# Patient Record
Sex: FEMALE | Race: WHITE | URBAN - METROPOLITAN AREA
[De-identification: names, ages, dates, MRNs, and addresses within clinical notes are randomized per-mention and may not be internally consistent; named-entity substitution may affect disease eponyms.]

---

## 2021-12-03 ENCOUNTER — OFFICE VISIT (OUTPATIENT)
Dept: FAMILY MEDICINE CLINIC | Facility: CLINIC | Age: 81
End: 2021-12-03
Payer: COMMERCIAL

## 2021-12-03 VITALS
TEMPERATURE: 99 F | HEART RATE: 90 BPM | BODY MASS INDEX: 27.72 KG/M2 | OXYGEN SATURATION: 96 % | RESPIRATION RATE: 20 BRPM | HEIGHT: 61 IN | SYSTOLIC BLOOD PRESSURE: 128 MMHG | DIASTOLIC BLOOD PRESSURE: 72 MMHG | WEIGHT: 146.81 LBS

## 2021-12-03 DIAGNOSIS — T81.89XA NON-HEALING SURGICAL WOUND, INITIAL ENCOUNTER: Primary | ICD-10-CM

## 2021-12-03 PROCEDURE — 3074F SYST BP LT 130 MM HG: CPT | Performed by: PHYSICIAN ASSISTANT

## 2021-12-03 PROCEDURE — 3078F DIAST BP <80 MM HG: CPT | Performed by: PHYSICIAN ASSISTANT

## 2021-12-03 PROCEDURE — 99213 OFFICE O/P EST LOW 20 MIN: CPT | Performed by: PHYSICIAN ASSISTANT

## 2021-12-03 PROCEDURE — 3008F BODY MASS INDEX DOCD: CPT | Performed by: PHYSICIAN ASSISTANT

## 2021-12-03 RX ORDER — AMLODIPINE BESYLATE 5 MG/1
TABLET ORAL
COMMUNITY
Start: 2021-10-28

## 2021-12-03 RX ORDER — RIVAROXABAN 2.5 MG/1
2.5 TABLET, FILM COATED ORAL 2 TIMES DAILY
COMMUNITY
Start: 2021-08-19

## 2021-12-03 RX ORDER — OMEPRAZOLE 40 MG/1
CAPSULE, DELAYED RELEASE ORAL
COMMUNITY
Start: 2021-10-12

## 2021-12-03 RX ORDER — FUROSEMIDE 40 MG/1
TABLET ORAL
COMMUNITY
Start: 2021-10-12

## 2021-12-03 RX ORDER — GLIMEPIRIDE 4 MG/1
TABLET ORAL
COMMUNITY
Start: 2021-10-12

## 2021-12-03 RX ORDER — FLUOXETINE 10 MG/1
CAPSULE ORAL
COMMUNITY
Start: 2021-11-23

## 2021-12-03 NOTE — PROGRESS NOTES
CHIEF COMPLAINT:   Patient presents with: Other: left toes amputation on 10/2021      HPI:   Vasquez Spaulding is a 78year old female who presents for evaluation of a skin stump of left foot . Here with son.  Patient states wound was changed by wound care to murmur  EXTREMITIES: patients left foot wrapped, asked to take down dressing but patient refused, states does not want unwrapped and would like just picture of foot from earlier today looked at   LYMPH: no cervical, submandibular, or supraclavicular lympha

## 2021-12-26 PROBLEM — E78.5 HYPERLIPIDEMIA, UNSPECIFIED HYPERLIPIDEMIA TYPE: Status: ACTIVE | Noted: 2021-12-26

## 2021-12-26 PROBLEM — F33.0 MDD (MAJOR DEPRESSIVE DISORDER), RECURRENT EPISODE, MILD (HCC): Status: ACTIVE | Noted: 2021-12-26

## 2021-12-26 PROBLEM — M54.9 ACUTE MIDLINE BACK PAIN, UNSPECIFIED BACK LOCATION: Status: ACTIVE | Noted: 2021-12-26

## 2021-12-26 PROBLEM — K21.9 GASTROESOPHAGEAL REFLUX DISEASE WITHOUT ESOPHAGITIS: Status: ACTIVE | Noted: 2021-12-26

## 2021-12-26 PROBLEM — E11.8 CONTROLLED TYPE 2 DIABETES MELLITUS WITH COMPLICATION, WITH LONG-TERM CURRENT USE OF INSULIN (HCC): Status: ACTIVE | Noted: 2021-12-26

## 2021-12-26 PROBLEM — I73.9 PVD (PERIPHERAL VASCULAR DISEASE) (HCC): Status: ACTIVE | Noted: 2021-12-26

## 2021-12-26 PROBLEM — N18.30 STAGE 3 CHRONIC KIDNEY DISEASE, UNSPECIFIED WHETHER STAGE 3A OR 3B CKD (HCC): Status: ACTIVE | Noted: 2021-12-26

## 2021-12-26 PROBLEM — R53.1 WEAKNESS: Status: ACTIVE | Noted: 2021-12-26

## 2021-12-26 PROBLEM — D64.9 ANEMIA, UNSPECIFIED TYPE: Status: ACTIVE | Noted: 2021-12-26

## 2021-12-26 PROBLEM — Z79.4 CONTROLLED TYPE 2 DIABETES MELLITUS WITH COMPLICATION, WITH LONG-TERM CURRENT USE OF INSULIN (HCC): Status: ACTIVE | Noted: 2021-12-26

## 2021-12-26 PROBLEM — I10 ESSENTIAL HYPERTENSION: Status: ACTIVE | Noted: 2021-12-26

## 2021-12-26 PROBLEM — M86.9 OSTEOMYELITIS OF LEFT FOOT, UNSPECIFIED TYPE (HCC): Status: ACTIVE | Noted: 2021-12-26

## 2021-12-26 PROBLEM — G62.9 NEUROPATHY: Status: ACTIVE | Noted: 2021-12-26

## 2021-12-31 ENCOUNTER — LAB REQUISITION (OUTPATIENT)
Dept: LAB | Facility: HOSPITAL | Age: 81
End: 2021-12-31
Payer: MEDICARE

## 2021-12-31 DIAGNOSIS — N39.0 URINARY TRACT INFECTION, SITE NOT SPECIFIED: ICD-10-CM

## 2021-12-31 LAB
BILIRUB UR QL STRIP.AUTO: NEGATIVE
COLOR UR AUTO: YELLOW
GLUCOSE UR STRIP.AUTO-MCNC: 50 MG/DL
KETONES UR STRIP.AUTO-MCNC: NEGATIVE MG/DL
NITRITE UR QL STRIP.AUTO: NEGATIVE
PH UR STRIP.AUTO: 5 [PH] (ref 5–8)
PROT UR STRIP.AUTO-MCNC: 100 MG/DL
RBC #/AREA URNS AUTO: >10 /HPF
RBC UR QL AUTO: NEGATIVE
SP GR UR STRIP.AUTO: 1.02 (ref 1–1.03)
UROBILINOGEN UR STRIP.AUTO-MCNC: <2 MG/DL
WBC #/AREA URNS AUTO: >50 /HPF
WBC CLUMPS UR QL AUTO: PRESENT /HPF

## 2021-12-31 PROCEDURE — 87077 CULTURE AEROBIC IDENTIFY: CPT | Performed by: FAMILY MEDICINE

## 2021-12-31 PROCEDURE — 87186 SC STD MICRODIL/AGAR DIL: CPT | Performed by: FAMILY MEDICINE

## 2021-12-31 PROCEDURE — 87086 URINE CULTURE/COLONY COUNT: CPT | Performed by: FAMILY MEDICINE

## 2021-12-31 PROCEDURE — 81001 URINALYSIS AUTO W/SCOPE: CPT | Performed by: FAMILY MEDICINE

## 2022-01-06 ENCOUNTER — NURSE ONLY (OUTPATIENT)
Dept: LAB | Age: 82
End: 2022-01-06
Attending: FAMILY MEDICINE
Payer: MEDICARE

## 2022-01-06 DIAGNOSIS — E11.9 DIABETES (HCC): Primary | ICD-10-CM

## 2022-01-06 LAB
ALBUMIN SERPL-MCNC: 3 G/DL (ref 3.4–5)
ALBUMIN/GLOB SERPL: 1.2 {RATIO} (ref 1–2)
ALP LIVER SERPL-CCNC: 101 U/L
ALT SERPL-CCNC: 17 U/L
ANION GAP SERPL CALC-SCNC: 6 MMOL/L (ref 0–18)
AST SERPL-CCNC: 12 U/L (ref 15–37)
BILIRUB SERPL-MCNC: 0.2 MG/DL (ref 0.1–2)
BUN BLD-MCNC: 26 MG/DL (ref 7–18)
CALCIUM BLD-MCNC: 10 MG/DL (ref 8.5–10.1)
CHLORIDE SERPL-SCNC: 102 MMOL/L (ref 98–112)
CO2 SERPL-SCNC: 30 MMOL/L (ref 21–32)
CREAT BLD-MCNC: 1.44 MG/DL
FASTING STATUS PATIENT QL REPORTED: YES
GLOBULIN PLAS-MCNC: 2.5 G/DL (ref 2.8–4.4)
GLUCOSE BLD-MCNC: 177 MG/DL (ref 70–99)
OSMOLALITY SERPL CALC.SUM OF ELEC: 295 MOSM/KG (ref 275–295)
POTASSIUM SERPL-SCNC: 4.1 MMOL/L (ref 3.5–5.1)
PROT SERPL-MCNC: 5.5 G/DL (ref 6.4–8.2)
SODIUM SERPL-SCNC: 138 MMOL/L (ref 136–145)

## 2022-01-06 PROCEDURE — 80053 COMPREHEN METABOLIC PANEL: CPT

## 2022-08-24 ENCOUNTER — HOSPITAL ENCOUNTER (INPATIENT)
Facility: HOSPITAL | Age: 82
LOS: 9 days | Discharge: NURSING, REHAB OR LONG-TERM CARE | DRG: 617 | End: 2022-09-02
Attending: EMERGENCY MEDICINE | Admitting: INTERNAL MEDICINE
Payer: MEDICARE

## 2022-08-24 ENCOUNTER — APPOINTMENT (OUTPATIENT)
Dept: GENERAL RADIOLOGY | Facility: HOSPITAL | Age: 82
DRG: 617 | End: 2022-08-24
Attending: EMERGENCY MEDICINE
Payer: MEDICARE

## 2022-08-24 DIAGNOSIS — E13.52: ICD-10-CM

## 2022-08-24 DIAGNOSIS — E11.8 DIABETIC FOOT (HCC): Primary | ICD-10-CM

## 2022-08-24 DIAGNOSIS — R73.9 HYPERGLYCEMIA: ICD-10-CM

## 2022-08-24 LAB
ALBUMIN SERPL-MCNC: 2.7 G/DL (ref 3.4–5)
ALBUMIN/GLOB SERPL: 0.6 {RATIO} (ref 1–2)
ALP LIVER SERPL-CCNC: 167 U/L
ALT SERPL-CCNC: 13 U/L
ANION GAP SERPL CALC-SCNC: 10 MMOL/L (ref 0–18)
AST SERPL-CCNC: 9 U/L (ref 15–37)
ATRIAL RATE: 112 BPM
BASOPHILS # BLD AUTO: 0.14 X10(3) UL (ref 0–0.2)
BASOPHILS NFR BLD AUTO: 0.6 %
BILIRUB SERPL-MCNC: 0.5 MG/DL (ref 0.1–2)
BILIRUB UR QL STRIP.AUTO: NEGATIVE
BUN BLD-MCNC: 25 MG/DL (ref 7–18)
CALCIUM BLD-MCNC: 9.9 MG/DL (ref 8.5–10.1)
CHLORIDE SERPL-SCNC: 86 MMOL/L (ref 98–112)
CLARITY UR REFRACT.AUTO: CLEAR
CO2 SERPL-SCNC: 30 MMOL/L (ref 21–32)
COLOR UR AUTO: YELLOW
CREAT BLD-MCNC: 1.79 MG/DL
CREAT UR-SCNC: 18.9 MG/DL
EOSINOPHIL # BLD AUTO: 0.07 X10(3) UL (ref 0–0.7)
EOSINOPHIL NFR BLD AUTO: 0.3 %
ERYTHROCYTE [DISTWIDTH] IN BLOOD BY AUTOMATED COUNT: 13.8 %
EST. AVERAGE GLUCOSE BLD GHB EST-MCNC: 367 MG/DL (ref 68–126)
GFR SERPLBLD BASED ON 1.73 SQ M-ARVRAT: 28 ML/MIN/1.73M2 (ref 60–?)
GLOBULIN PLAS-MCNC: 4.2 G/DL (ref 2.8–4.4)
GLUCOSE BLD-MCNC: 167 MG/DL (ref 70–99)
GLUCOSE BLD-MCNC: 422 MG/DL (ref 70–99)
GLUCOSE UR STRIP.AUTO-MCNC: 500 MG/DL
HBA1C MFR BLD: 14.4 % (ref ?–5.7)
HCT VFR BLD AUTO: 40.1 %
HGB BLD-MCNC: 14 G/DL
IMM GRANULOCYTES # BLD AUTO: 0.44 X10(3) UL (ref 0–1)
IMM GRANULOCYTES NFR BLD: 2 %
KETONES UR STRIP.AUTO-MCNC: NEGATIVE MG/DL
LACTATE SERPL-SCNC: 1.5 MMOL/L (ref 0.4–2)
LEUKOCYTE ESTERASE UR QL STRIP.AUTO: NEGATIVE
LYMPHOCYTES # BLD AUTO: 1.78 X10(3) UL (ref 1–4)
LYMPHOCYTES NFR BLD AUTO: 8 %
MCH RBC QN AUTO: 29.2 PG (ref 26–34)
MCHC RBC AUTO-ENTMCNC: 34.9 G/DL (ref 31–37)
MCV RBC AUTO: 83.5 FL
MONOCYTES # BLD AUTO: 1.05 X10(3) UL (ref 0.1–1)
MONOCYTES NFR BLD AUTO: 4.7 %
NEUTROPHILS # BLD AUTO: 18.81 X10 (3) UL (ref 1.5–7.7)
NEUTROPHILS # BLD AUTO: 18.81 X10(3) UL (ref 1.5–7.7)
NEUTROPHILS NFR BLD AUTO: 84.4 %
NITRITE UR QL STRIP.AUTO: NEGATIVE
OSMOLALITY SERPL CALC.SUM OF ELEC: 284 MOSM/KG (ref 275–295)
P AXIS: 55 DEGREES
P-R INTERVAL: 156 MS
PH UR STRIP.AUTO: 7 [PH] (ref 5–8)
PLATELET # BLD AUTO: 315 10(3)UL (ref 150–450)
POTASSIUM SERPL-SCNC: 3.5 MMOL/L (ref 3.5–5.1)
PROT SERPL-MCNC: 6.9 G/DL (ref 6.4–8.2)
Q-T INTERVAL: 336 MS
QRS DURATION: 82 MS
QTC CALCULATION (BEZET): 458 MS
R AXIS: -40 DEGREES
RBC # BLD AUTO: 4.8 X10(6)UL
RBC UR QL AUTO: NEGATIVE
SARS-COV-2 RNA RESP QL NAA+PROBE: NOT DETECTED
SODIUM SERPL-SCNC: 126 MMOL/L (ref 136–145)
SODIUM SERPL-SCNC: 32 MMOL/L
SP GR UR STRIP.AUTO: 1.01 (ref 1–1.03)
T AXIS: 38 DEGREES
TROPONIN I HIGH SENSITIVITY: 17 NG/L
UROBILINOGEN UR STRIP.AUTO-MCNC: 0.2 MG/DL
UUN UR-MCNC: 208 MG/DL
VENTRICULAR RATE: 112 BPM
WBC # BLD AUTO: 22.3 X10(3) UL (ref 4–11)

## 2022-08-24 PROCEDURE — 99285 EMERGENCY DEPT VISIT HI MDM: CPT

## 2022-08-24 PROCEDURE — 96365 THER/PROPH/DIAG IV INF INIT: CPT

## 2022-08-24 PROCEDURE — 85025 COMPLETE CBC W/AUTO DIFF WBC: CPT | Performed by: EMERGENCY MEDICINE

## 2022-08-24 PROCEDURE — 82570 ASSAY OF URINE CREATININE: CPT | Performed by: INTERNAL MEDICINE

## 2022-08-24 PROCEDURE — 87040 BLOOD CULTURE FOR BACTERIA: CPT | Performed by: EMERGENCY MEDICINE

## 2022-08-24 PROCEDURE — 84300 ASSAY OF URINE SODIUM: CPT | Performed by: INTERNAL MEDICINE

## 2022-08-24 PROCEDURE — 84540 ASSAY OF URINE/UREA-N: CPT | Performed by: INTERNAL MEDICINE

## 2022-08-24 PROCEDURE — 81015 MICROSCOPIC EXAM OF URINE: CPT | Performed by: EMERGENCY MEDICINE

## 2022-08-24 PROCEDURE — 86140 C-REACTIVE PROTEIN: CPT | Performed by: PODIATRIST

## 2022-08-24 PROCEDURE — 81001 URINALYSIS AUTO W/SCOPE: CPT | Performed by: EMERGENCY MEDICINE

## 2022-08-24 PROCEDURE — 83036 HEMOGLOBIN GLYCOSYLATED A1C: CPT | Performed by: INTERNAL MEDICINE

## 2022-08-24 PROCEDURE — 80053 COMPREHEN METABOLIC PANEL: CPT | Performed by: EMERGENCY MEDICINE

## 2022-08-24 PROCEDURE — 82962 GLUCOSE BLOOD TEST: CPT

## 2022-08-24 PROCEDURE — 71045 X-RAY EXAM CHEST 1 VIEW: CPT | Performed by: EMERGENCY MEDICINE

## 2022-08-24 PROCEDURE — 83605 ASSAY OF LACTIC ACID: CPT | Performed by: EMERGENCY MEDICINE

## 2022-08-24 PROCEDURE — 36415 COLL VENOUS BLD VENIPUNCTURE: CPT

## 2022-08-24 PROCEDURE — 96361 HYDRATE IV INFUSION ADD-ON: CPT

## 2022-08-24 PROCEDURE — 93005 ELECTROCARDIOGRAM TRACING: CPT

## 2022-08-24 PROCEDURE — 84484 ASSAY OF TROPONIN QUANT: CPT | Performed by: EMERGENCY MEDICINE

## 2022-08-24 PROCEDURE — 93010 ELECTROCARDIOGRAM REPORT: CPT

## 2022-08-24 RX ORDER — INSULIN ASPART 100 [IU]/ML
0.2 INJECTION, SOLUTION INTRAVENOUS; SUBCUTANEOUS ONCE
Status: COMPLETED | OUTPATIENT
Start: 2022-08-24 | End: 2022-08-24

## 2022-08-24 RX ORDER — ACETAMINOPHEN 500 MG
500 TABLET ORAL EVERY 4 HOURS PRN
Status: DISCONTINUED | OUTPATIENT
Start: 2022-08-24 | End: 2022-09-02

## 2022-08-24 RX ORDER — MULTIVIT-MIN/IRON FUM/FOLIC AC 7.5 MG-4
1 TABLET ORAL DAILY
COMMUNITY

## 2022-08-24 RX ORDER — DEXTROSE MONOHYDRATE 25 G/50ML
50 INJECTION, SOLUTION INTRAVENOUS
Status: DISCONTINUED | OUTPATIENT
Start: 2022-08-24 | End: 2022-09-02

## 2022-08-24 RX ORDER — LOVASTATIN 20 MG/1
20 TABLET ORAL NIGHTLY
COMMUNITY

## 2022-08-24 RX ORDER — SODIUM CHLORIDE 9 MG/ML
INJECTION, SOLUTION INTRAVENOUS CONTINUOUS
Status: ACTIVE | OUTPATIENT
Start: 2022-08-24 | End: 2022-08-25

## 2022-08-24 RX ORDER — MELATONIN
1000 DAILY
COMMUNITY

## 2022-08-24 RX ORDER — DESVENLAFAXINE 25 MG/1
50 TABLET, EXTENDED RELEASE ORAL DAILY
COMMUNITY
End: 2022-09-02

## 2022-08-24 RX ORDER — METOCLOPRAMIDE HYDROCHLORIDE 5 MG/ML
5 INJECTION INTRAMUSCULAR; INTRAVENOUS EVERY 8 HOURS PRN
Status: DISCONTINUED | OUTPATIENT
Start: 2022-08-24 | End: 2022-09-02

## 2022-08-24 RX ORDER — HEPARIN SODIUM 5000 [USP'U]/ML
5000 INJECTION, SOLUTION INTRAVENOUS; SUBCUTANEOUS EVERY 8 HOURS SCHEDULED
Status: DISCONTINUED | OUTPATIENT
Start: 2022-08-24 | End: 2022-09-02

## 2022-08-24 RX ORDER — ASPIRIN 81 MG/1
81 TABLET ORAL DAILY
COMMUNITY

## 2022-08-24 RX ORDER — TRAMADOL HYDROCHLORIDE 50 MG/1
50 TABLET ORAL EVERY 8 HOURS PRN
Status: ON HOLD | COMMUNITY
End: 2022-09-02

## 2022-08-24 RX ORDER — NICOTINE POLACRILEX 4 MG
15 LOZENGE BUCCAL
Status: DISCONTINUED | OUTPATIENT
Start: 2022-08-24 | End: 2022-09-02

## 2022-08-24 RX ORDER — ZINC SULFATE 50(220)MG
220 CAPSULE ORAL DAILY
COMMUNITY

## 2022-08-24 RX ORDER — MULTIVITAMIN/IRON/FOLIC ACID 18MG-0.4MG
500 TABLET ORAL DAILY
COMMUNITY

## 2022-08-24 RX ORDER — SODIUM CHLORIDE 9 MG/ML
INJECTION, SOLUTION INTRAVENOUS CONTINUOUS
Status: DISCONTINUED | OUTPATIENT
Start: 2022-08-24 | End: 2022-08-25

## 2022-08-24 RX ORDER — NICOTINE POLACRILEX 4 MG
30 LOZENGE BUCCAL
Status: DISCONTINUED | OUTPATIENT
Start: 2022-08-24 | End: 2022-09-02

## 2022-08-24 RX ORDER — ONDANSETRON 2 MG/ML
4 INJECTION INTRAMUSCULAR; INTRAVENOUS EVERY 6 HOURS PRN
Status: DISCONTINUED | OUTPATIENT
Start: 2022-08-24 | End: 2022-09-02

## 2022-08-24 RX ORDER — MELATONIN
325
COMMUNITY

## 2022-08-24 RX ORDER — MULTIVIT WITH MINERALS/LUTEIN
1000 TABLET ORAL DAILY
COMMUNITY

## 2022-08-25 ENCOUNTER — APPOINTMENT (OUTPATIENT)
Dept: GENERAL RADIOLOGY | Facility: HOSPITAL | Age: 82
DRG: 617 | End: 2022-08-25
Attending: PODIATRIST
Payer: MEDICARE

## 2022-08-25 LAB
ALBUMIN SERPL-MCNC: 2.2 G/DL (ref 3.4–5)
ANION GAP SERPL CALC-SCNC: 6 MMOL/L (ref 0–18)
BASOPHILS # BLD AUTO: 0.12 X10(3) UL (ref 0–0.2)
BASOPHILS NFR BLD AUTO: 0.6 %
BUN BLD-MCNC: 21 MG/DL (ref 7–18)
CALCIUM BLD-MCNC: 9.6 MG/DL (ref 8.5–10.1)
CHLORIDE SERPL-SCNC: 96 MMOL/L (ref 98–112)
CO2 SERPL-SCNC: 31 MMOL/L (ref 21–32)
CREAT BLD-MCNC: 1.51 MG/DL
CRP SERPL-MCNC: 12.9 MG/DL (ref ?–0.3)
CRP SERPL-MCNC: 17.9 MG/DL (ref ?–0.3)
EOSINOPHIL # BLD AUTO: 0.3 X10(3) UL (ref 0–0.7)
EOSINOPHIL NFR BLD AUTO: 1.5 %
ERYTHROCYTE [DISTWIDTH] IN BLOOD BY AUTOMATED COUNT: 13.8 %
ERYTHROCYTE [SEDIMENTATION RATE] IN BLOOD: 39 MM/HR
ERYTHROCYTE [SEDIMENTATION RATE] IN BLOOD: 39 MM/HR
GFR SERPLBLD BASED ON 1.73 SQ M-ARVRAT: 34 ML/MIN/1.73M2 (ref 60–?)
GLUCOSE BLD-MCNC: 132 MG/DL (ref 70–99)
GLUCOSE BLD-MCNC: 138 MG/DL (ref 70–99)
GLUCOSE BLD-MCNC: 225 MG/DL (ref 70–99)
GLUCOSE BLD-MCNC: 256 MG/DL (ref 70–99)
GLUCOSE BLD-MCNC: 275 MG/DL (ref 70–99)
HCT VFR BLD AUTO: 38.9 %
HGB BLD-MCNC: 13.2 G/DL
IMM GRANULOCYTES # BLD AUTO: 0.46 X10(3) UL (ref 0–1)
IMM GRANULOCYTES NFR BLD: 2.3 %
LYMPHOCYTES # BLD AUTO: 1.77 X10(3) UL (ref 1–4)
LYMPHOCYTES NFR BLD AUTO: 8.8 %
MCH RBC QN AUTO: 29 PG (ref 26–34)
MCHC RBC AUTO-ENTMCNC: 33.9 G/DL (ref 31–37)
MCV RBC AUTO: 85.5 FL
MONOCYTES # BLD AUTO: 1.26 X10(3) UL (ref 0.1–1)
MONOCYTES NFR BLD AUTO: 6.3 %
NEUTROPHILS # BLD AUTO: 16.14 X10 (3) UL (ref 1.5–7.7)
NEUTROPHILS # BLD AUTO: 16.14 X10(3) UL (ref 1.5–7.7)
NEUTROPHILS NFR BLD AUTO: 80.5 %
NT-PROBNP SERPL-MCNC: 1814 PG/ML (ref ?–450)
OSMOLALITY SERPL CALC.SUM OF ELEC: 281 MOSM/KG (ref 275–295)
PHOSPHATE SERPL-MCNC: 2.4 MG/DL (ref 2.5–4.9)
PLATELET # BLD AUTO: 308 10(3)UL (ref 150–450)
POTASSIUM SERPL-SCNC: 2.9 MMOL/L (ref 3.5–5.1)
RBC # BLD AUTO: 4.55 X10(6)UL
SODIUM SERPL-SCNC: 133 MMOL/L (ref 136–145)
WBC # BLD AUTO: 20.1 X10(3) UL (ref 4–11)

## 2022-08-25 PROCEDURE — 87205 SMEAR GRAM STAIN: CPT | Performed by: INTERNAL MEDICINE

## 2022-08-25 PROCEDURE — 82962 GLUCOSE BLOOD TEST: CPT

## 2022-08-25 PROCEDURE — 73620 X-RAY EXAM OF FOOT: CPT | Performed by: PODIATRIST

## 2022-08-25 PROCEDURE — 87186 SC STD MICRODIL/AGAR DIL: CPT | Performed by: INTERNAL MEDICINE

## 2022-08-25 PROCEDURE — 87075 CULTR BACTERIA EXCEPT BLOOD: CPT | Performed by: INTERNAL MEDICINE

## 2022-08-25 PROCEDURE — 87077 CULTURE AEROBIC IDENTIFY: CPT | Performed by: INTERNAL MEDICINE

## 2022-08-25 PROCEDURE — 85025 COMPLETE CBC W/AUTO DIFF WBC: CPT | Performed by: INTERNAL MEDICINE

## 2022-08-25 PROCEDURE — 83880 ASSAY OF NATRIURETIC PEPTIDE: CPT | Performed by: INTERNAL MEDICINE

## 2022-08-25 PROCEDURE — 87081 CULTURE SCREEN ONLY: CPT | Performed by: INTERNAL MEDICINE

## 2022-08-25 PROCEDURE — 87070 CULTURE OTHR SPECIMN AEROBIC: CPT | Performed by: INTERNAL MEDICINE

## 2022-08-25 PROCEDURE — 84132 ASSAY OF SERUM POTASSIUM: CPT | Performed by: INTERNAL MEDICINE

## 2022-08-25 PROCEDURE — 87147 CULTURE TYPE IMMUNOLOGIC: CPT | Performed by: INTERNAL MEDICINE

## 2022-08-25 PROCEDURE — 86140 C-REACTIVE PROTEIN: CPT | Performed by: INTERNAL MEDICINE

## 2022-08-25 PROCEDURE — 85652 RBC SED RATE AUTOMATED: CPT | Performed by: PODIATRIST

## 2022-08-25 PROCEDURE — 85652 RBC SED RATE AUTOMATED: CPT | Performed by: INTERNAL MEDICINE

## 2022-08-25 PROCEDURE — 80069 RENAL FUNCTION PANEL: CPT | Performed by: INTERNAL MEDICINE

## 2022-08-25 RX ORDER — ACETAMINOPHEN AND CODEINE PHOSPHATE 300; 30 MG/1; MG/1
2 TABLET ORAL EVERY 8 HOURS
Status: DISCONTINUED | OUTPATIENT
Start: 2022-08-25 | End: 2022-08-28

## 2022-08-25 RX ORDER — SODIUM CHLORIDE 9 MG/ML
INJECTION, SOLUTION INTRAVENOUS CONTINUOUS
Status: ACTIVE | OUTPATIENT
Start: 2022-08-25 | End: 2022-08-25

## 2022-08-25 RX ORDER — DULOXETIN HYDROCHLORIDE 20 MG/1
20 CAPSULE, DELAYED RELEASE ORAL 2 TIMES DAILY
Status: DISCONTINUED | OUTPATIENT
Start: 2022-08-25 | End: 2022-09-02

## 2022-08-25 RX ORDER — DESVENLAFAXINE 50 MG/1
50 TABLET, EXTENDED RELEASE ORAL DAILY
Status: DISCONTINUED | OUTPATIENT
Start: 2022-08-25 | End: 2022-08-25

## 2022-08-25 RX ORDER — TRAMADOL HYDROCHLORIDE 50 MG/1
50 TABLET ORAL EVERY 8 HOURS PRN
Status: DISCONTINUED | OUTPATIENT
Start: 2022-08-25 | End: 2022-08-30

## 2022-08-25 RX ORDER — ASPIRIN 81 MG/1
81 TABLET ORAL DAILY
Status: DISCONTINUED | OUTPATIENT
Start: 2022-08-25 | End: 2022-09-02

## 2022-08-25 RX ORDER — ACETAMINOPHEN AND CODEINE PHOSPHATE 300; 30 MG/1; MG/1
1 TABLET ORAL EVERY 8 HOURS
Status: DISCONTINUED | OUTPATIENT
Start: 2022-08-25 | End: 2022-08-25

## 2022-08-25 RX ORDER — MELATONIN
325
Status: DISCONTINUED | OUTPATIENT
Start: 2022-08-25 | End: 2022-09-02

## 2022-08-25 RX ORDER — AMLODIPINE BESYLATE 5 MG/1
5 TABLET ORAL DAILY
Status: DISCONTINUED | OUTPATIENT
Start: 2022-08-25 | End: 2022-09-02

## 2022-08-25 RX ORDER — PRAVASTATIN SODIUM 10 MG
10 TABLET ORAL NIGHTLY
Status: DISCONTINUED | OUTPATIENT
Start: 2022-08-25 | End: 2022-09-02

## 2022-08-25 RX ORDER — PANTOPRAZOLE SODIUM 40 MG/1
40 TABLET, DELAYED RELEASE ORAL
Status: DISCONTINUED | OUTPATIENT
Start: 2022-08-25 | End: 2022-09-02

## 2022-08-25 NOTE — PROGRESS NOTES
[facility] Pharmacy Note:  Renal Adjustment for piperacillin/tazobactam (Antonella Harkins)    Rudy Najjar is a 78year old patient who has been prescribed piperacillin/tazobactam (ZOSYN) 3.375 gm every 8 hrs. The estimated creatinine clearance is 17.7 mL/min (A) (based on SCr of 1.79 mg/dL (H)). The dose has been adjusted to piperacillin/tazobactam (ZOSYN) 3.375 gm every 12 hrs per hospital renal dose adjustment protocol for treatment of diabetic foot. Pharmacy will follow and adjust dose as warranted for additional renal function changes.     Thank you,    [address] [place] [address]  8/24/2022  8:35 PM

## 2022-08-25 NOTE — PLAN OF CARE
Problem: PAIN - ADULT  Goal: Verbalizes/displays adequate comfort level or patient's stated pain goal  Description: INTERVENTIONS:  - Encourage pt to monitor pain and request assistance  - Assess pain using appropriate pain scale  - Administer analgesics based on type and severity of pain and evaluate response  - Implement non-pharmacological measures as appropriate and evaluate response  - Consider cultural and social influences on pain and pain management  - Manage/alleviate anxiety  - Utilize distraction and/or relaxation techniques  - Monitor for opioid side effects  - Notify MD/LIP if interventions unsuccessful or patient reports new pain  - Anticipate increased pain with activity and pre-medicate as appropriate  Outcome: Progressing   Patient alert and oriented x 2-3, disoriented at times. Complaint of right foot pain which could be related to the foot wound. Right 2nd toe with wound, dry but red, swollen and very tender. Waiting for ID and Podiatry to see patient.

## 2022-08-25 NOTE — PLAN OF CARE
New admission from the ED for right foot diabetic wound. Patient alert and orientated X3 with intermitted confusion. Right foot wound on toe open to air,patient refuses to have anything wrapped around her foot, scant drainage noted to wound. .  Complaints of burning pain when touched. Ambulated with walker and min assist. Left foot has history of toe amputations. Patient had large soft bowl movement this evening. IV antibiotics running in the right [facility] with IV fluids running. See mar for orders. Tylenol given for pain.

## 2022-08-26 ENCOUNTER — APPOINTMENT (OUTPATIENT)
Dept: ULTRASOUND IMAGING | Facility: HOSPITAL | Age: 82
DRG: 617 | End: 2022-08-26
Attending: PODIATRIST
Payer: MEDICARE

## 2022-08-26 ENCOUNTER — APPOINTMENT (OUTPATIENT)
Dept: MRI IMAGING | Facility: HOSPITAL | Age: 82
DRG: 617 | End: 2022-08-26
Attending: PODIATRIST
Payer: MEDICARE

## 2022-08-26 LAB
ALBUMIN SERPL-MCNC: 2.3 G/DL (ref 3.4–5)
ANION GAP SERPL CALC-SCNC: 5 MMOL/L (ref 0–18)
BASOPHILS # BLD: 0 X10(3) UL (ref 0–0.2)
BASOPHILS NFR BLD: 0 %
BILIRUB UR QL STRIP.AUTO: NEGATIVE
BUN BLD-MCNC: 18 MG/DL (ref 7–18)
CALCIUM BLD-MCNC: 9.7 MG/DL (ref 8.5–10.1)
CHLORIDE SERPL-SCNC: 100 MMOL/L (ref 98–112)
CLARITY UR REFRACT.AUTO: CLEAR
CO2 SERPL-SCNC: 27 MMOL/L (ref 21–32)
COLOR UR AUTO: YELLOW
CREAT BLD-MCNC: 1.32 MG/DL
EOSINOPHIL # BLD: 0 X10(3) UL (ref 0–0.7)
EOSINOPHIL NFR BLD: 0 %
ERYTHROCYTE [DISTWIDTH] IN BLOOD BY AUTOMATED COUNT: 14.3 %
GFR SERPLBLD BASED ON 1.73 SQ M-ARVRAT: 40 ML/MIN/1.73M2 (ref 60–?)
GLUCOSE BLD-MCNC: 170 MG/DL (ref 70–99)
GLUCOSE BLD-MCNC: 176 MG/DL (ref 70–99)
GLUCOSE BLD-MCNC: 178 MG/DL (ref 70–99)
GLUCOSE BLD-MCNC: 263 MG/DL (ref 70–99)
GLUCOSE BLD-MCNC: 314 MG/DL (ref 70–99)
GLUCOSE UR STRIP.AUTO-MCNC: 500 MG/DL
HCT VFR BLD AUTO: 38.8 %
HGB BLD-MCNC: 12.8 G/DL
KETONES UR STRIP.AUTO-MCNC: 15 MG/DL
LEUKOCYTE ESTERASE UR QL STRIP.AUTO: NEGATIVE
LYMPHOCYTES NFR BLD: 10 %
LYMPHOCYTES NFR BLD: 2.08 X10(3) UL (ref 1–4)
MCH RBC QN AUTO: 29.1 PG (ref 26–34)
MCHC RBC AUTO-ENTMCNC: 33 G/DL (ref 31–37)
MCV RBC AUTO: 88.2 FL
MONOCYTES # BLD: 1.04 X10(3) UL (ref 0.1–1)
MONOCYTES NFR BLD: 5 %
MORPHOLOGY: NORMAL
NEUTROPHILS # BLD AUTO: 17.01 X10 (3) UL (ref 1.5–7.7)
NEUTROPHILS NFR BLD: 84 %
NEUTS BAND NFR BLD: 1 %
NEUTS HYPERSEG # BLD: 17.68 X10(3) UL (ref 1.5–7.7)
NITRITE UR QL STRIP.AUTO: NEGATIVE
OSMOLALITY SERPL CALC.SUM OF ELEC: 280 MOSM/KG (ref 275–295)
OSMOLALITY UR: 445 MOSM/KG (ref 300–1300)
PH UR STRIP.AUTO: 6 [PH] (ref 5–8)
PHOSPHATE SERPL-MCNC: 2.4 MG/DL (ref 2.5–4.9)
PHOSPHATE SERPL-MCNC: 2.4 MG/DL (ref 2.5–4.9)
PLATELET # BLD AUTO: 302 10(3)UL (ref 150–450)
PLATELET MORPHOLOGY: NORMAL
POTASSIUM SERPL-SCNC: 3.7 MMOL/L (ref 3.5–5.1)
POTASSIUM SERPL-SCNC: 3.9 MMOL/L (ref 3.5–5.1)
RBC # BLD AUTO: 4.4 X10(6)UL
SODIUM SERPL-SCNC: 132 MMOL/L (ref 136–145)
SODIUM SERPL-SCNC: 34 MMOL/L
SP GR UR STRIP.AUTO: 1.01 (ref 1–1.03)
TOTAL CELLS COUNTED BLD: 100
UROBILINOGEN UR STRIP.AUTO-MCNC: 0.2 MG/DL
WBC # BLD AUTO: 20.8 X10(3) UL (ref 4–11)
YEAST UR QL: PRESENT /HPF

## 2022-08-26 PROCEDURE — 84100 ASSAY OF PHOSPHORUS: CPT | Performed by: INTERNAL MEDICINE

## 2022-08-26 PROCEDURE — 85007 BL SMEAR W/DIFF WBC COUNT: CPT | Performed by: INTERNAL MEDICINE

## 2022-08-26 PROCEDURE — 73718 MRI LOWER EXTREMITY W/O DYE: CPT | Performed by: PODIATRIST

## 2022-08-26 PROCEDURE — 82962 GLUCOSE BLOOD TEST: CPT

## 2022-08-26 PROCEDURE — 84300 ASSAY OF URINE SODIUM: CPT | Performed by: INTERNAL MEDICINE

## 2022-08-26 PROCEDURE — 85027 COMPLETE CBC AUTOMATED: CPT | Performed by: INTERNAL MEDICINE

## 2022-08-26 PROCEDURE — 81015 MICROSCOPIC EXAM OF URINE: CPT | Performed by: INTERNAL MEDICINE

## 2022-08-26 PROCEDURE — 81001 URINALYSIS AUTO W/SCOPE: CPT | Performed by: INTERNAL MEDICINE

## 2022-08-26 PROCEDURE — 83935 ASSAY OF URINE OSMOLALITY: CPT | Performed by: INTERNAL MEDICINE

## 2022-08-26 PROCEDURE — 80069 RENAL FUNCTION PANEL: CPT | Performed by: INTERNAL MEDICINE

## 2022-08-26 PROCEDURE — 93926 LOWER EXTREMITY STUDY: CPT | Performed by: PODIATRIST

## 2022-08-26 PROCEDURE — 85025 COMPLETE CBC W/AUTO DIFF WBC: CPT | Performed by: INTERNAL MEDICINE

## 2022-08-26 RX ORDER — VANCOMYCIN HYDROCHLORIDE
25 ONCE
Status: COMPLETED | OUTPATIENT
Start: 2022-08-26 | End: 2022-08-26

## 2022-08-26 NOTE — CM/SW NOTE
08/26/22 1000   CM/SW Referral Data   Referral Source Social Work (self-referral)   Reason for Referral Discharge planning   Informant Patient;Son;EMR;Clinical Staff Member   Patient Info   Patient's Current Mental Status at Time of Assessment Alert;Oriented;Memory Impairments   Patient's Home Environment Assisted Living   Post Acute Care Provider Upon Admission   ([facility])   Number of Levels in Home 1   Patient lives with Alone   Patient Status Prior to Admission   Independent with ADLs and Mobility No   Pt. requires assistance with Housework;Driving;Meals; Medications; Finances   Services in place prior to admission DME/Supplies at home   Type of DME/Supplies Rollator Walker   Discharge Needs   Anticipated D/C needs To be determined       Patient is an 77 y/o woman admitted for diabetic foot infection. Met with pt, pt's son Rasheeda Sandoval ([phone]) and dtr-in-law Sherron Frederick at bedside to discuss DC planning. Pt has lived at the [facility] for about 1 year. She normally ambulates with a rollator walker. Pt's family reports pt refuses to leave her room, does not participate in social activities and has refused to participate in on-site PT/OT services. Pt stated \"I don't want to go and if I don't want to I don't have to. \"  Pt has a previous history of RACH at [facility]. Discussed DC planning and possible needs. Previous notes indicate pt does not want to return to [facility], but pt stated she would be willing to go back there at [place]. Pt's son feels pt may need RACH. If so, he would like to consider [facility]. Plan to follow for MD and therapy recommendations for further DC planning. / to remain available for support and/or discharge planning.      Tracie Sprague, [facility]  Discharge Planner  [phone]

## 2022-08-26 NOTE — CONSULTS
[facility]    PATIENT'S NAME: Eliz Vyas   ATTENDING PHYSICIAN: Emily Lepe MD   CONSULTING PHYSICIAN: Celeste Joshua. Rod Chávez M.D. PATIENT ACCOUNT#:   [de-identified]    LOCATION:  [address] [place] [address]  MEDICAL RECORD #:   [id number]       YOB: 1940  ADMISSION DATE:       08/24/2022      CONSULT DATE:  08/25/2022    REPORT OF CONSULTATION    HISTORY OF PRESENT ILLNESS:  This is an 70-year-old diabetic woman who has peripheral vascular disease. She has lost all of the toes with a transmetatarsal amputation on the left. She has lost her fifth toe on the right. She says she is unhappy with her previous podiatrist and will not go back to see her again. She does not know, however, when either of those surgeries were, and it is unclear with how long her right foot and remaining toes have been a problem, but it sounds like they have been over the past 2 to 3 weeks at least.  She presents with a diabetic foot infection and elevated white count. I am asked to evaluate. I did not smell a foul order but others did. The patient denies trauma and does not know how this happened. I can elicit no other current GI, , cardiovascular, CNS, or respiratory symptoms. PAST MEDICAL HISTORY:  Significant for the above. MEDICATIONS:  Currently on Zosyn. Other medications reviewed in Epic. ALLERGIES:  Environmental and varicella-zoster immune globulin. SOCIAL HISTORY:  Negative for cigarettes and alcohol. FAMILY HISTORY:  I could not elicit this from the patient. REVIEW OF SYSTEMS:  She denies having a poor appetite. She denies weight loss. She denies trauma. I suspect she is not a great historian, however. PHYSICAL EXAMINATION:    GENERAL:  This is a thin, elderly patient, pleasantly confused. No acute distress. VITAL SIGNS:  She is afebrile. Other vital signs stable. HEENT:  Pale conjunctivae. No oral lesions. NECK:  Supple. No JVD or adenopathy. LUNGS:  Seem clear.   HEART: A 2/6 late systolic murmur. ABDOMEN:  Nontender. No masses, rebound, or organomegaly. EXTREMITIES:  The left transmetatarsal amputation wound is clean and dry. On the right, the second toe has an evolving gangrene. There is erythema and swelling of the other toes and erythema that extends onto the forefoot to the ankle. No crepitus, fluctuance, or lymphangitis, however, is appreciated. There may be faint infection onto the shin. There is crusted drainage but no active areas draining at the present time. The wound care service has a nice photograph in Epic under their note from today. LABORATORY DATA:  Blood cultures are negative. There is no wound culture, as there is no drainage. Urinalysis looks okay. White count was 7.1 in 2021; this admission 22.3, repeated at 20.1. Hemoglobin 13.2, platelets 049,612. Polys 80, lymphs 8, monos 6, immature granulocytes 2.3. Sedimentation rate is 39. Foot x-ray:  Nothing specific for osteomyelitis. There is osteopenia and looks like bone spurs. IMPRESSION:  Leukocytosis and a severe diabetic foot infection with evolving gangrene. As there is a foul odor, but I do not see any drainage, I think there is wet and dry gangrene. I will order an arterial Doppler. I suspect a vascular opinion will also be needed and podiatry help also needed, as she appears headed towards an amputation, though the amount of amputation remains unclear at the time of this dictation. A MRSA screen will be checked. If there is any foot drainage, we will try to culture it. I have spoken with the patient and texted with Dr. Milena Flanagan. Further suggestions to follow. Thank you very much for allowing me to see this patient. Dictated By Danielle Booth M.D.  d: 08/25/2022 14:26:33  t: 08/25/2022 18:39:44  Job [id number]  RGX/

## 2022-08-26 NOTE — PLAN OF CARE
Problem: Diabetes/Glucose Control  Goal: Glucose maintained within prescribed range  Description: INTERVENTIONS:  - Monitor Blood Glucose as ordered  - Assess for signs and symptoms of hyperglycemia and hypoglycemia  - Administer ordered medications to maintain glucose within target range  - Assess barriers to adequate nutritional intake and initiate nutrition consult as needed  - Instruct patient on self management of diabetes  Outcome: Progressing     Problem: PAIN - ADULT  Goal: Verbalizes/displays adequate comfort level or patient's stated pain goal  Description: INTERVENTIONS:  - Encourage pt to monitor pain and request assistance  - Assess pain using appropriate pain scale  - Administer analgesics based on type and severity of pain and evaluate response  - Implement non-pharmacological measures as appropriate and evaluate response  - Consider cultural and social influences on pain and pain management  - Manage/alleviate anxiety  - Utilize distraction and/or relaxation techniques  - Monitor for opioid side effects  - Notify MD/LIP if interventions unsuccessful or patient reports new pain  - Anticipate increased pain with activity and pre-medicate as appropriate  Outcome: Progressing   Patient is alert and oriented x 3, can be forgetful at times. Right foot with kerlex dressing, looks clean and dry. Betadine applied to right 2nd to wound and new dressing applied. MRI RLE done this morning. Patient complaint of right foot pain, Tramadol tablet given. ID ordered Vancomycin IV  due to:  AEROBIC SMEAR  No WBCs seen      2+ Gram Positive Cocci      1+ Gram Positive Rods        Son at the bedside, updated on plan of care.

## 2022-08-26 NOTE — CONSULTS
[facility] Pharmacy Note:  Renal Adjustment for piperacillin/tazobactam (Karlos Elizabeth Teran is a 78year old patient who has been prescribed renal dose adjusted piperacillin/tazobactam (ZOSYN) 3.375 g every 12 hrs. The estimated creatinine clearance is 23.9 mL/min (A) (based on SCr of 1.32 mg/dL (H)). The dose has been adjusted to piperacillin/tazobactam (ZOSYN) 3.375 g every 8 hrs per hospital renal dose adjustment protocol for treatment of diabetic foot. Pharmacy will follow and adjust dose as warranted for additional renal function changes.     Thank you,    Jeison Navarrete, PharmD  8/26/2022  2:56 PM

## 2022-08-26 NOTE — PLAN OF CARE
Patient alert and orientated X3 with intermitted confusion. Right foot wound dressed with gauzed and kerlix rolls, scant drainage noted to wound. .  Complaints of burning pain when touched. Ambulated with walker and min assist. Left foot has history of toe amputations. IV antibiotics running in the right forearm with IV fluids running. See mar for orders. Tylenol given for pain.

## 2022-08-26 NOTE — CONSULTS
[facility] Dosing Service    Initial Pharmacokinetic Consult for Vancomycin Dosing     Giovanny James is a 78year old patient who is being treated for diabetic foot. Pharmacy is consulted to dose vancomycin by Sarah Marks MD, [facility]. Weights:  Ideal body weight: 47.8 kg (105 lb 6.1 oz)  Adjusted ideal body weight: 54.1 kg (119 lb 3.7 oz)  Actual weight:  63.5 kg (140 lb)    Labs:  Lab Results   Component Value Date    CREATSERUM 1.32 08/26/2022      CrCl:  Estimated Creatinine Clearance: 23.9 mL/min (A) (based on SCr of 1.32 mg/dL (H)). Based on the above:    1. This patient has received a loading dose of Vancomycin  1500 mg IVPB (25mg/kg) x 1 dose @ 1058. This will be followed by 750 mg Q 24 hours based upon adjusted body weight of 54.1 kg and renal function. 2. Vancomycin level will be obtained prior to the 3rd dose on 8/28 @1030. Goal trough is 10-15 mcg/mL unless otherwise noted by ordering provider. 3. Pharmacy will order SCr as clinically indicated while on vancomycin to assess renal function. 4. Pharmacy will follow and monitor renal function, toxicity and efficacy. We appreciate the opportunity to assist in the care of this patient.     Delfina Castano PharmD  8/26/2022  2:20 PM  [address] [place] [address] Extension: [phone]

## 2022-08-27 ENCOUNTER — ANESTHESIA (OUTPATIENT)
Dept: SURGERY | Facility: HOSPITAL | Age: 82
End: 2022-08-27
Payer: MEDICARE

## 2022-08-27 ENCOUNTER — ANESTHESIA EVENT (OUTPATIENT)
Dept: SURGERY | Facility: HOSPITAL | Age: 82
End: 2022-08-27
Payer: MEDICARE

## 2022-08-27 LAB
ALBUMIN SERPL-MCNC: 2 G/DL (ref 3.4–5)
ANION GAP SERPL CALC-SCNC: 5 MMOL/L (ref 0–18)
BASOPHILS # BLD: 0 X10(3) UL (ref 0–0.2)
BASOPHILS NFR BLD: 0 %
BUN BLD-MCNC: 18 MG/DL (ref 7–18)
CALCIUM BLD-MCNC: 9.1 MG/DL (ref 8.5–10.1)
CHLORIDE SERPL-SCNC: 102 MMOL/L (ref 98–112)
CO2 SERPL-SCNC: 28 MMOL/L (ref 21–32)
CREAT BLD-MCNC: 1.52 MG/DL
EOSINOPHIL # BLD: 0.21 X10(3) UL (ref 0–0.7)
EOSINOPHIL NFR BLD: 1 %
ERYTHROCYTE [DISTWIDTH] IN BLOOD BY AUTOMATED COUNT: 14.3 %
GFR SERPLBLD BASED ON 1.73 SQ M-ARVRAT: 34 ML/MIN/1.73M2 (ref 60–?)
GLUCOSE BLD-MCNC: 125 MG/DL (ref 70–99)
GLUCOSE BLD-MCNC: 156 MG/DL (ref 70–99)
GLUCOSE BLD-MCNC: 156 MG/DL (ref 70–99)
GLUCOSE BLD-MCNC: 207 MG/DL (ref 70–99)
GLUCOSE BLD-MCNC: 281 MG/DL (ref 70–99)
HCT VFR BLD AUTO: 36.1 %
HGB BLD-MCNC: 11.7 G/DL
LYMPHOCYTES NFR BLD: 1.7 X10(3) UL (ref 1–4)
LYMPHOCYTES NFR BLD: 8 %
MCH RBC QN AUTO: 28.6 PG (ref 26–34)
MCHC RBC AUTO-ENTMCNC: 32.4 G/DL (ref 31–37)
MCV RBC AUTO: 88.3 FL
MONOCYTES # BLD: 1.91 X10(3) UL (ref 0.1–1)
MONOCYTES NFR BLD: 9 %
MORPHOLOGY: NORMAL
NEUTROPHILS # BLD AUTO: 16.88 X10 (3) UL (ref 1.5–7.7)
NEUTROPHILS NFR BLD: 81 %
NEUTS BAND NFR BLD: 1 %
NEUTS HYPERSEG # BLD: 17.38 X10(3) UL (ref 1.5–7.7)
OSMOLALITY SERPL CALC.SUM OF ELEC: 285 MOSM/KG (ref 275–295)
PHOSPHATE SERPL-MCNC: 2.9 MG/DL (ref 2.5–4.9)
PHOSPHATE SERPL-MCNC: 2.9 MG/DL (ref 2.5–4.9)
PLATELET # BLD AUTO: 274 10(3)UL (ref 150–450)
PLATELET MORPHOLOGY: NORMAL
POTASSIUM SERPL-SCNC: 3.5 MMOL/L (ref 3.5–5.1)
RBC # BLD AUTO: 4.09 X10(6)UL
SODIUM SERPL-SCNC: 135 MMOL/L (ref 136–145)
TOTAL CELLS COUNTED BLD: 100
WBC # BLD AUTO: 21.2 X10(3) UL (ref 4–11)

## 2022-08-27 PROCEDURE — 87075 CULTR BACTERIA EXCEPT BLOOD: CPT | Performed by: PODIATRIST

## 2022-08-27 PROCEDURE — 87147 CULTURE TYPE IMMUNOLOGIC: CPT | Performed by: PODIATRIST

## 2022-08-27 PROCEDURE — 0Y6M0ZB DETACHMENT AT RIGHT FOOT, PARTIAL 2ND RAY, OPEN APPROACH: ICD-10-PCS | Performed by: PODIATRIST

## 2022-08-27 PROCEDURE — 82962 GLUCOSE BLOOD TEST: CPT

## 2022-08-27 PROCEDURE — 85007 BL SMEAR W/DIFF WBC COUNT: CPT | Performed by: INTERNAL MEDICINE

## 2022-08-27 PROCEDURE — 88305 TISSUE EXAM BY PATHOLOGIST: CPT | Performed by: PODIATRIST

## 2022-08-27 PROCEDURE — 88311 DECALCIFY TISSUE: CPT | Performed by: PODIATRIST

## 2022-08-27 PROCEDURE — 85027 COMPLETE CBC AUTOMATED: CPT | Performed by: INTERNAL MEDICINE

## 2022-08-27 PROCEDURE — 84100 ASSAY OF PHOSPHORUS: CPT | Performed by: INTERNAL MEDICINE

## 2022-08-27 PROCEDURE — 85025 COMPLETE CBC W/AUTO DIFF WBC: CPT | Performed by: INTERNAL MEDICINE

## 2022-08-27 PROCEDURE — 80069 RENAL FUNCTION PANEL: CPT | Performed by: INTERNAL MEDICINE

## 2022-08-27 PROCEDURE — 87205 SMEAR GRAM STAIN: CPT | Performed by: PODIATRIST

## 2022-08-27 PROCEDURE — 87070 CULTURE OTHR SPECIMN AEROBIC: CPT | Performed by: PODIATRIST

## 2022-08-27 RX ORDER — BUPIVACAINE HYDROCHLORIDE 5 MG/ML
INJECTION, SOLUTION EPIDURAL; INTRACAUDAL AS NEEDED
Status: DISCONTINUED | OUTPATIENT
Start: 2022-08-27 | End: 2022-08-27 | Stop reason: HOSPADM

## 2022-08-27 RX ORDER — NALOXONE HYDROCHLORIDE 0.4 MG/ML
80 INJECTION, SOLUTION INTRAMUSCULAR; INTRAVENOUS; SUBCUTANEOUS AS NEEDED
Status: CANCELLED | OUTPATIENT
Start: 2022-08-27 | End: 2022-08-28

## 2022-08-27 RX ORDER — HYDROMORPHONE HYDROCHLORIDE 1 MG/ML
0.2 INJECTION, SOLUTION INTRAMUSCULAR; INTRAVENOUS; SUBCUTANEOUS EVERY 5 MIN PRN
Status: CANCELLED | OUTPATIENT
Start: 2022-08-27 | End: 2022-08-28

## 2022-08-27 RX ORDER — HYDROMORPHONE HYDROCHLORIDE 1 MG/ML
0.4 INJECTION, SOLUTION INTRAMUSCULAR; INTRAVENOUS; SUBCUTANEOUS EVERY 5 MIN PRN
Status: CANCELLED | OUTPATIENT
Start: 2022-08-27 | End: 2022-08-28

## 2022-08-27 RX ORDER — METRONIDAZOLE 250 MG/1
250 TABLET ORAL EVERY 8 HOURS SCHEDULED
Status: DISCONTINUED | OUTPATIENT
Start: 2022-08-27 | End: 2022-09-02

## 2022-08-27 RX ORDER — LIDOCAINE HYDROCHLORIDE 10 MG/ML
INJECTION, SOLUTION EPIDURAL; INFILTRATION; INTRACAUDAL; PERINEURAL AS NEEDED
Status: DISCONTINUED | OUTPATIENT
Start: 2022-08-27 | End: 2022-08-27 | Stop reason: SURG

## 2022-08-27 RX ORDER — HYDROMORPHONE HYDROCHLORIDE 1 MG/ML
0.6 INJECTION, SOLUTION INTRAMUSCULAR; INTRAVENOUS; SUBCUTANEOUS EVERY 5 MIN PRN
Status: CANCELLED | OUTPATIENT
Start: 2022-08-27 | End: 2022-08-28

## 2022-08-27 RX ORDER — LIDOCAINE HYDROCHLORIDE 10 MG/ML
INJECTION, SOLUTION INFILTRATION; PERINEURAL AS NEEDED
Status: DISCONTINUED | OUTPATIENT
Start: 2022-08-27 | End: 2022-08-27 | Stop reason: HOSPADM

## 2022-08-27 RX ORDER — SODIUM CHLORIDE, SODIUM LACTATE, POTASSIUM CHLORIDE, CALCIUM CHLORIDE 600; 310; 30; 20 MG/100ML; MG/100ML; MG/100ML; MG/100ML
INJECTION, SOLUTION INTRAVENOUS CONTINUOUS PRN
Status: DISCONTINUED | OUTPATIENT
Start: 2022-08-27 | End: 2022-08-27 | Stop reason: SURG

## 2022-08-27 RX ORDER — SODIUM CHLORIDE 9 MG/ML
INJECTION, SOLUTION INTRAVENOUS CONTINUOUS
Status: DISCONTINUED | OUTPATIENT
Start: 2022-08-27 | End: 2022-08-28

## 2022-08-27 RX ORDER — ONDANSETRON 2 MG/ML
4 INJECTION INTRAMUSCULAR; INTRAVENOUS EVERY 6 HOURS PRN
Status: CANCELLED | OUTPATIENT
Start: 2022-08-27

## 2022-08-27 RX ORDER — SODIUM CHLORIDE, SODIUM LACTATE, POTASSIUM CHLORIDE, CALCIUM CHLORIDE 600; 310; 30; 20 MG/100ML; MG/100ML; MG/100ML; MG/100ML
INJECTION, SOLUTION INTRAVENOUS CONTINUOUS
Status: CANCELLED | OUTPATIENT
Start: 2022-08-27

## 2022-08-27 RX ADMIN — LIDOCAINE HYDROCHLORIDE 50 MG: 10 INJECTION, SOLUTION EPIDURAL; INFILTRATION; INTRACAUDAL; PERINEURAL at 19:42:00

## 2022-08-27 RX ADMIN — SODIUM CHLORIDE, SODIUM LACTATE, POTASSIUM CHLORIDE, CALCIUM CHLORIDE: 600; 310; 30; 20 INJECTION, SOLUTION INTRAVENOUS at 19:42:00

## 2022-08-27 NOTE — PLAN OF CARE
Patient resting in room, vitals WDL, on RA. Voiding freely. Up min with gait belt and walker. Swelling & redness noted to R foot. Gauze & kerlix to R foot, no drainage noted. Patient reports pain is mild-moderate, relieved by PO medication. Plan of care discussed with patient, all questions answered. 0645:  MRSA + to R foot, paged ID.

## 2022-08-27 NOTE — PLAN OF CARE
Patient is alert and oriented x3, forgetful at times. VS stable, She has been kept NPO since 10am for surgery tonight. Left foot dressing was changed today per orders. , Patient has been able to ambulate to the bathroom and back with min assistance. Safety precautions are in place.

## 2022-08-27 NOTE — PROGRESS NOTES
[facility] Pharmacy Note:  Renal Adjustment for cefepime (MAXIPIME)    Julita Pacheco is a 78year old patient who has been prescribed cefepime (MAXIPIME) 1 gm every 8 hrs. The estimated creatinine clearance is 20.8 mL/min (A) (based on SCr of 1.52 mg/dL (H)). The dose has been adjusted to cefepime (MAXIPIME) 1 gm every 12 hrs per hospital renal dose adjustment protocol for treatment of  diabetic foot . Pharmacy will follow and adjust dose as warranted for additional renal function changes.     Thank you,    Cherise Mcadams, [facility]  8/27/2022  9:25 AM

## 2022-08-28 LAB
ALBUMIN SERPL-MCNC: 1.9 G/DL (ref 3.4–5)
ALBUMIN SERPL-MCNC: 1.9 G/DL (ref 3.4–5)
ALBUMIN/GLOB SERPL: 0.5 {RATIO} (ref 1–2)
ALP LIVER SERPL-CCNC: 131 U/L
ALT SERPL-CCNC: 17 U/L
ANION GAP SERPL CALC-SCNC: 7 MMOL/L (ref 0–18)
ANION GAP SERPL CALC-SCNC: 7 MMOL/L (ref 0–18)
AST SERPL-CCNC: 22 U/L (ref 15–37)
BASOPHILS # BLD: 0 X10(3) UL (ref 0–0.2)
BASOPHILS NFR BLD: 0 %
BILIRUB SERPL-MCNC: 0.5 MG/DL (ref 0.1–2)
BUN BLD-MCNC: 13 MG/DL (ref 7–18)
BUN BLD-MCNC: 13 MG/DL (ref 7–18)
CALCIUM BLD-MCNC: 9.5 MG/DL (ref 8.5–10.1)
CALCIUM BLD-MCNC: 9.5 MG/DL (ref 8.5–10.1)
CHLORIDE SERPL-SCNC: 106 MMOL/L (ref 98–112)
CHLORIDE SERPL-SCNC: 106 MMOL/L (ref 98–112)
CO2 SERPL-SCNC: 24 MMOL/L (ref 21–32)
CO2 SERPL-SCNC: 24 MMOL/L (ref 21–32)
CREAT BLD-MCNC: 1.13 MG/DL
CREAT BLD-MCNC: 1.13 MG/DL
EOSINOPHIL # BLD: 0.4 X10(3) UL (ref 0–0.7)
EOSINOPHIL NFR BLD: 2 %
ERYTHROCYTE [DISTWIDTH] IN BLOOD BY AUTOMATED COUNT: 14.4 %
GFR SERPLBLD BASED ON 1.73 SQ M-ARVRAT: 48 ML/MIN/1.73M2 (ref 60–?)
GFR SERPLBLD BASED ON 1.73 SQ M-ARVRAT: 48 ML/MIN/1.73M2 (ref 60–?)
GLOBULIN PLAS-MCNC: 3.8 G/DL (ref 2.8–4.4)
GLUCOSE BLD-MCNC: 137 MG/DL (ref 70–99)
GLUCOSE BLD-MCNC: 141 MG/DL (ref 70–99)
GLUCOSE BLD-MCNC: 141 MG/DL (ref 70–99)
GLUCOSE BLD-MCNC: 233 MG/DL (ref 70–99)
GLUCOSE BLD-MCNC: 261 MG/DL (ref 70–99)
GLUCOSE BLD-MCNC: 290 MG/DL (ref 70–99)
GLUCOSE BLD-MCNC: 84 MG/DL (ref 70–99)
HCT VFR BLD AUTO: 35.9 %
HGB BLD-MCNC: 11.7 G/DL
LYMPHOCYTES NFR BLD: 0.8 X10(3) UL (ref 1–4)
LYMPHOCYTES NFR BLD: 4 %
MCH RBC QN AUTO: 28.7 PG (ref 26–34)
MCHC RBC AUTO-ENTMCNC: 32.6 G/DL (ref 31–37)
MCV RBC AUTO: 88 FL
METAMYELOCYTES # BLD: 0.2 X10(3) UL
METAMYELOCYTES NFR BLD: 1 %
MONOCYTES # BLD: 0.8 X10(3) UL (ref 0.1–1)
MONOCYTES NFR BLD: 4 %
MORPHOLOGY: NORMAL
MYELOCYTES # BLD: 0.2 X10(3) UL
MYELOCYTES NFR BLD: 1 %
NEUTROPHILS # BLD AUTO: 16.4 X10 (3) UL (ref 1.5–7.7)
NEUTROPHILS NFR BLD: 88 %
NEUTS HYPERSEG # BLD: 17.6 X10(3) UL (ref 1.5–7.7)
OSMOLALITY SERPL CALC.SUM OF ELEC: 286 MOSM/KG (ref 275–295)
OSMOLALITY SERPL CALC.SUM OF ELEC: 286 MOSM/KG (ref 275–295)
PHOSPHATE SERPL-MCNC: 2.7 MG/DL (ref 2.5–4.9)
PLATELET # BLD AUTO: 269 10(3)UL (ref 150–450)
PLATELET MORPHOLOGY: NORMAL
POTASSIUM SERPL-SCNC: 3.5 MMOL/L (ref 3.5–5.1)
POTASSIUM SERPL-SCNC: 3.5 MMOL/L (ref 3.5–5.1)
PROT SERPL-MCNC: 5.7 G/DL (ref 6.4–8.2)
RBC # BLD AUTO: 4.08 X10(6)UL
SARS-COV-2 RNA RESP QL NAA+PROBE: NOT DETECTED
SODIUM SERPL-SCNC: 137 MMOL/L (ref 136–145)
SODIUM SERPL-SCNC: 137 MMOL/L (ref 136–145)
TOTAL CELLS COUNTED BLD: 100
VANCOMYCIN TROUGH SERPL-MCNC: 7.3 UG/ML (ref 10–20)
WBC # BLD AUTO: 20 X10(3) UL (ref 4–11)

## 2022-08-28 PROCEDURE — 85027 COMPLETE CBC AUTOMATED: CPT | Performed by: INTERNAL MEDICINE

## 2022-08-28 PROCEDURE — 84100 ASSAY OF PHOSPHORUS: CPT | Performed by: INTERNAL MEDICINE

## 2022-08-28 PROCEDURE — 80202 ASSAY OF VANCOMYCIN: CPT | Performed by: INTERNAL MEDICINE

## 2022-08-28 PROCEDURE — 82962 GLUCOSE BLOOD TEST: CPT

## 2022-08-28 PROCEDURE — 80053 COMPREHEN METABOLIC PANEL: CPT | Performed by: INTERNAL MEDICINE

## 2022-08-28 PROCEDURE — 85007 BL SMEAR W/DIFF WBC COUNT: CPT | Performed by: INTERNAL MEDICINE

## 2022-08-28 PROCEDURE — 85025 COMPLETE CBC W/AUTO DIFF WBC: CPT | Performed by: INTERNAL MEDICINE

## 2022-08-28 RX ORDER — FUROSEMIDE 40 MG/1
40 TABLET ORAL DAILY
Status: DISCONTINUED | OUTPATIENT
Start: 2022-08-28 | End: 2022-08-29

## 2022-08-28 RX ORDER — HYDROCODONE BITARTRATE AND ACETAMINOPHEN 5; 325 MG/1; MG/1
1 TABLET ORAL EVERY 6 HOURS PRN
Status: DISCONTINUED | OUTPATIENT
Start: 2022-08-28 | End: 2022-08-31

## 2022-08-28 NOTE — ANESTHESIA POSTPROCEDURE EVALUATION
[id number] [facility] Patient Status:  Inpatient   Age/Gender 78year old female MRN [id number]   [facility] SURGERY Attending Mercedez Lindsey, [address] PCP Luci Seo MD       Anesthesia Post-op Note    INCISION AND DEBRDIDMENT RIGHT  FOOT, OPEN SECOND RAY  AMPUTATION - RIGHT    Procedure Summary     Date: 08/27/22 Room / Location: [facility] [facility] OR 05 / 1404 [facility] OR    Anesthesia Start: 1937 Anesthesia Stop: 2103    Procedure: INCISION AND DEBRDIDMENT RIGHT  FOOT, OPEN SECOND RAY  AMPUTATION - RIGHT (Right ) Diagnosis:       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 ([facility] [address] [address]      (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 ([facility] [address] [E13.52])    Surgeons: Eva Kim DPM Anesthesiologist: Sy yWatt MD    Anesthesia Type: MAC ASA Status: 3          Anesthesia Type: MAC    Vitals Value Taken Time   /72 08/27/22 2103   Temp 99.1 08/27/22 2103   Pulse 98 08/27/22 2103   Resp 19 08/27/22 2103   SpO2 100 08/27/22 2103       Patient Location: PACU    Anesthesia Type: MAC    Airway Patency: patent    Postop Pain Control: adequate    Mental Status: preanesthetic baseline    Nausea/Vomiting: none    Cardiopulmonary/Hydration status: stable euvolemic    Complications: no apparent anesthesia related complications    Postop vital signs: stable    Dental Exam: Unchanged from Preop

## 2022-08-28 NOTE — PROGRESS NOTES
NURSING ADMISSION NOTE  Patient admitted via bed from PACU. Oriented to room. Safety precautions initiated. Bed in low position. Call light in reach. Patient resting in room, vitals WDL, on RA. Voiding freely. Kerlix & ace wrap to R foot, CDI. Patient denies pain & N/T to RLE. Plan of care discussed with patient, all questions answered.

## 2022-08-28 NOTE — PROGRESS NOTES
NURSING ADMISSION NOTE  Patient admitted via bed from PACU. Oriented to room. Safety precautions initiated. Bed in low position. Call light in reach. Patient resting in room, vitals WDL, on RA. DTV by 0400. Kerlix & ace wrap to R foot, CDI. Patient denies pain & N/T to RLE. Plan of care discussed with patient, all questions answered. 0430:  Bladder scan 803 mL, patient up voiding in bedside commode. Voided mL, PVR mL.

## 2022-08-28 NOTE — OPERATIVE REPORT
Operative Note  Date: 08/27/2022     Patient Name: Julita Pacheco    Preoperative Diagnosis: 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 ([facility] [E13.52]    Postoperative Diagnosis:    1. Osteomyelitis, right foot    2. Wet gangrene, right foot    3. Abscess right foot    Primary Surgeon: Clari Emmanuel DPM    Assistant: None    Procedures:    1. Incision and drainage of right foot    2. Open second ray amputation right foot    Surgical Findings: See operative note    Anesthesia: MAC with local    Complications: none    Implants: None    Specimen:    1. Bone right foot    2. Abscess right foot    Drains: None    Condition: Stable    Estimated Blood Loss: 10 mL    Description of procedure:  Patient is seen in preoperative holding and informed consent was obtained, surgical site was marked, and n.p.o. status was confirmed. Patient was brought to the OR and placed supine on the operating table. A timeout was performed confirming patient identity, procedure, and laterality. A preoperative block was then performed via a secondary block using local anesthesia. The limb was then prepped and draped usual aseptic fashion. Attention was directed to the second digit of the right foot which demonstrated signs of wet gangrene, incision was placed dorsally beginning at the midshaft of the second ray where a 2 semielliptical wedge incision was placed surrounding the second MTP joint dorsally and plantarly. Disarticulation of the second MTP joint was performed, specimen was sent for pathology. Next adilene purulence was expressed upon immediate incision and a culture swab was taken and sent for cultures. Bone fragment was then obtained and specimen was sent for culture and sensitivity. Following this resection of the second ray was performed to the level of healthy viable bone. Sharp excisional debridement was then performed of all nonviable tissue to the level of necrotic bone and soft tissue.   Continuous thorough debridement performed of all nonviable tissue, expression of purulence was evacuated, to the level of viable appearing tissue. Next x3 3 L bags of sterile saline were used for irrigation with the assistance of cystoscopy tubing. Thorough irrigation was performed of the surgical site. Surgical site was then dressed and packed with dilute Betadine soaked gauze packing into the wound site followed by sterile compressive dressing. A compressive dressing was applied and the drapes were removed, patient was then brought to PACU vital signs stable neurovascular is intact. Patient will be followed on the floor closely with podiatry. Plan will be for vascular intervention by vascular surgery followed by continued wound care in the meantime. Once level amputation will be determined, reconsult podiatry for surgical evaluation.     Bartholome Bamberger, DPM  08/27/2022

## 2022-08-28 NOTE — PROGRESS NOTES
Pt voided 650mL, post void residual showed 200 mL of urine. Pt scheduled for angio tomorrow, consent acquired from son via phone call. NPO at midnight with small breakfast in the morning.

## 2022-08-28 NOTE — BRIEF OP NOTE
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 Specimen:   1. Bone right foot    2. Abscess right foot     Estimated Blood Loss: 10 mL    Postoperative recommendations:    Nonweightbearing to the right foot. May resume diet. Nursing form daily dressing changes starting 24 hours with Betadine packed wet-to-dry dressings to the right foot. Follow intraoperative cultures appreciate ID recommendations. Consult with vascular surgery regarding revascularization options for the right lower extremity. Prognosis guarded as nominal bleeding identified without the use of tourniquet, nonviable tissue noted throughout the second ray, concern for healing potential.  Pain control per primary. Limits to remain elevated.     Maria Eugenia Delacruz DPM  8/27/2022  8:54 PM

## 2022-08-28 NOTE — CONSULTS
[facility] Dosing Service    Follow-up Pharmacokinetic Consult for Vancomycin Dosing     Pepper Rosa is a 78year old patient who is being treated for diabetic foot. Patient is on day 3 of vancomycin and is currently receiving 750 mg Q 24 hours. Labs:  Lab Results   Component Value Date    CREATSERUM 1.13 08/28/2022    CREATSERUM 1.13 08/28/2022      CrCl:  Estimated Creatinine Clearance: 28 mL/min (A) (based on SCr of 1.13 mg/dL (H)). Levels:  trough: 7.3 mcg/mL    Based on the above:    1. Increase Vancomycin to 1000 mg IVPB Q 24 hours based on pharmacokinetics and renal function. 2.  Will re-check vancomycin trough level(s) prior to 3rd dose. Goal trough is 10-15 mcg/mL unless otherwise noted by ordering provider. 3.  Pharmacy will order SCr as clinically indicated while on vancomycin to assess renal function. 4. Pharmacy will follow and monitor renal function, toxicity and efficacy. We appreciate the opportunity to assist in the care of this patient.     Wilma Chang PharmD  8/28/2022  11:04 AM  [address] [place] [address] Extension: [phone]

## 2022-08-28 NOTE — PLAN OF CARE
Pt is alert and oriented x3 and forgetful at times. Patient is drowsy but awoken with stimuli. Decreased sensation in the right foot. Dressing is intact, foot is elevated with pillows, pt to be none weight bearing to LRE, cap refill <3 seconds. Vitals within normal limits. Remains on PO/IV abx. Plan for angio on Monday.

## 2022-08-29 ENCOUNTER — APPOINTMENT (OUTPATIENT)
Dept: INTERVENTIONAL RADIOLOGY/VASCULAR | Facility: HOSPITAL | Age: 82
DRG: 617 | End: 2022-08-29
Attending: SURGERY
Payer: MEDICARE

## 2022-08-29 LAB
ALBUMIN SERPL-MCNC: 1.8 G/DL (ref 3.4–5)
ANION GAP SERPL CALC-SCNC: 7 MMOL/L (ref 0–18)
BASOPHILS # BLD: 0 X10(3) UL (ref 0–0.2)
BASOPHILS NFR BLD: 0 %
BUN BLD-MCNC: 14 MG/DL (ref 7–18)
CALCIUM BLD-MCNC: 9.6 MG/DL (ref 8.5–10.1)
CHLORIDE SERPL-SCNC: 105 MMOL/L (ref 98–112)
CO2 SERPL-SCNC: 26 MMOL/L (ref 21–32)
CREAT BLD-MCNC: 1.29 MG/DL
EOSINOPHIL # BLD: 0.67 X10(3) UL (ref 0–0.7)
EOSINOPHIL NFR BLD: 4 %
ERYTHROCYTE [DISTWIDTH] IN BLOOD BY AUTOMATED COUNT: 14.3 %
GFR SERPLBLD BASED ON 1.73 SQ M-ARVRAT: 41 ML/MIN/1.73M2 (ref 60–?)
GLUCOSE BLD-MCNC: 194 MG/DL (ref 70–99)
GLUCOSE BLD-MCNC: 202 MG/DL (ref 70–99)
GLUCOSE BLD-MCNC: 202 MG/DL (ref 70–99)
GLUCOSE BLD-MCNC: 266 MG/DL (ref 70–99)
GLUCOSE BLD-MCNC: 281 MG/DL (ref 70–99)
HCT VFR BLD AUTO: 37.7 %
HGB BLD-MCNC: 11.8 G/DL
LYMPHOCYTES NFR BLD: 1.84 X10(3) UL (ref 1–4)
LYMPHOCYTES NFR BLD: 11 %
MCH RBC QN AUTO: 28.4 PG (ref 26–34)
MCHC RBC AUTO-ENTMCNC: 31.3 G/DL (ref 31–37)
MCV RBC AUTO: 90.6 FL
MONOCYTES # BLD: 0.17 X10(3) UL (ref 0.1–1)
MONOCYTES NFR BLD: 1 %
MORPHOLOGY: NORMAL
NEUTROPHILS # BLD AUTO: 11.79 X10 (3) UL (ref 1.5–7.7)
NEUTROPHILS NFR BLD: 84 %
NEUTS HYPERSEG # BLD: 14.03 X10(3) UL (ref 1.5–7.7)
OSMOLALITY SERPL CALC.SUM OF ELEC: 292 MOSM/KG (ref 275–295)
PHOSPHATE SERPL-MCNC: 2.2 MG/DL (ref 2.5–4.9)
PLATELET # BLD AUTO: 297 10(3)UL (ref 150–450)
PLATELET MORPHOLOGY: NORMAL
POTASSIUM SERPL-SCNC: 3.4 MMOL/L (ref 3.5–5.1)
RBC # BLD AUTO: 4.16 X10(6)UL
SODIUM SERPL-SCNC: 138 MMOL/L (ref 136–145)
TOTAL CELLS COUNTED BLD: 100
WBC # BLD AUTO: 16.7 X10(3) UL (ref 4–11)

## 2022-08-29 PROCEDURE — 82962 GLUCOSE BLOOD TEST: CPT

## 2022-08-29 PROCEDURE — 99153 MOD SED SAME PHYS/QHP EA: CPT | Performed by: SURGERY

## 2022-08-29 PROCEDURE — 75710 ARTERY X-RAYS ARM/LEG: CPT | Performed by: SURGERY

## 2022-08-29 PROCEDURE — 80069 RENAL FUNCTION PANEL: CPT | Performed by: INTERNAL MEDICINE

## 2022-08-29 PROCEDURE — 99152 MOD SED SAME PHYS/QHP 5/>YRS: CPT | Performed by: SURGERY

## 2022-08-29 PROCEDURE — 85027 COMPLETE CBC AUTOMATED: CPT | Performed by: INTERNAL MEDICINE

## 2022-08-29 PROCEDURE — 85007 BL SMEAR W/DIFF WBC COUNT: CPT | Performed by: INTERNAL MEDICINE

## 2022-08-29 PROCEDURE — 37232 HC TRANSL ANGIOPLASTY TIBIAL PERONEAL UNIL EA ADDL: CPT | Performed by: SURGERY

## 2022-08-29 PROCEDURE — 37228 HC TRANSLUMINAL ANGIO TIBIAL PERONEAL UNILAT INIT: CPT | Performed by: SURGERY

## 2022-08-29 PROCEDURE — 047T3ZZ DILATION OF RIGHT PERONEAL ARTERY, PERCUTANEOUS APPROACH: ICD-10-PCS | Performed by: SURGERY

## 2022-08-29 PROCEDURE — 047P3ZZ DILATION OF RIGHT ANTERIOR TIBIAL ARTERY, PERCUTANEOUS APPROACH: ICD-10-PCS | Performed by: SURGERY

## 2022-08-29 PROCEDURE — 85025 COMPLETE CBC W/AUTO DIFF WBC: CPT | Performed by: INTERNAL MEDICINE

## 2022-08-29 RX ORDER — LIDOCAINE HYDROCHLORIDE 10 MG/ML
INJECTION, SOLUTION EPIDURAL; INFILTRATION; INTRACAUDAL; PERINEURAL
Status: COMPLETED
Start: 2022-08-29 | End: 2022-08-29

## 2022-08-29 RX ORDER — CLOPIDOGREL BISULFATE 75 MG/1
TABLET ORAL
Status: COMPLETED
Start: 2022-08-29 | End: 2022-08-29

## 2022-08-29 RX ORDER — POTASSIUM CHLORIDE 14.9 MG/ML
20 INJECTION INTRAVENOUS ONCE
Status: COMPLETED | OUTPATIENT
Start: 2022-08-29 | End: 2022-08-29

## 2022-08-29 RX ORDER — HEPARIN SODIUM 5000 [USP'U]/ML
INJECTION, SOLUTION INTRAVENOUS; SUBCUTANEOUS
Status: COMPLETED
Start: 2022-08-29 | End: 2022-08-29

## 2022-08-29 RX ORDER — CLOPIDOGREL BISULFATE 75 MG/1
75 TABLET ORAL DAILY
Status: DISCONTINUED | OUTPATIENT
Start: 2022-08-30 | End: 2022-09-02

## 2022-08-29 RX ORDER — FUROSEMIDE 40 MG/1
40 TABLET ORAL DAILY
Status: DISCONTINUED | OUTPATIENT
Start: 2022-08-30 | End: 2022-09-02

## 2022-08-29 RX ORDER — SODIUM CHLORIDE 9 MG/ML
INJECTION, SOLUTION INTRAVENOUS CONTINUOUS
Status: DISCONTINUED | OUTPATIENT
Start: 2022-08-29 | End: 2022-08-29

## 2022-08-29 RX ORDER — MIDAZOLAM HYDROCHLORIDE 1 MG/ML
INJECTION INTRAMUSCULAR; INTRAVENOUS
Status: COMPLETED
Start: 2022-08-29 | End: 2022-08-29

## 2022-08-29 RX ORDER — IODIXANOL 320 MG/ML
150 INJECTION, SOLUTION INTRAVASCULAR
Status: COMPLETED | OUTPATIENT
Start: 2022-08-29 | End: 2022-08-29

## 2022-08-29 NOTE — PLAN OF CARE
Aox3, denying numbness or tingling, surgical dressing changed with Betadine packing, NPO for Angio in afternoon, on room air , Heparin subcutaneous + ASA, NWB to RLE, on Vanco + Cefepine + Flagyl PO, voiding, mepilex to sacrum, consent signed on chart, no further needs, will continue to monitor.

## 2022-08-29 NOTE — PLAN OF CARE
Aox3, episodes of forgetfulness. POD 2 S/p open second ray amputation right foot. Dressing dry and intact. 4x4, wet to dry, abd , kerlix. IV ABx. NWB right leg. Mepilex to sacrum. Plan for angio. Pt transported to cath lab at 1350 then transfer to 7609 after.

## 2022-08-29 NOTE — BRIEF OP NOTE
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 US percutaneous access left common femoral artery  2. Selection of right common femoral artery and angiogram right leg  3. Balloon angioplasty of anterior tibial artery with 2.5 balloon  4. Balloon angioplasty of peroneal artery with 2.5 balloon    Surgeon(s) and Role:  Maci    Assistant(s):        Surgical Findings:   1. Right common femoral artery and profunda patent  2. Right SFA and popliteal patent  3.  Anterior tibial artery with mid segment occlusion treated with angioplasty, peroneal with multiple high grade stenosis treated with angioplasty - peroneal patent to distal third of leg     Specimen: none     Estimated Blood Loss: Blood Output: 10 mL (8/27/2022  9:15 PM)      Marino Torres MD  8/29/2022  3:19 PM

## 2022-08-30 PROBLEM — Z51.5 PALLIATIVE CARE ENCOUNTER: Status: ACTIVE | Noted: 2022-08-30

## 2022-08-30 LAB
ALBUMIN SERPL-MCNC: 1.9 G/DL (ref 3.4–5)
ANION GAP SERPL CALC-SCNC: 6 MMOL/L (ref 0–18)
BASOPHILS # BLD: 0 X10(3) UL (ref 0–0.2)
BASOPHILS NFR BLD: 0 %
BUN BLD-MCNC: 11 MG/DL (ref 7–18)
CALCIUM BLD-MCNC: 9.3 MG/DL (ref 8.5–10.1)
CHLORIDE SERPL-SCNC: 106 MMOL/L (ref 98–112)
CO2 SERPL-SCNC: 25 MMOL/L (ref 21–32)
CREAT BLD-MCNC: 0.99 MG/DL
EOSINOPHIL # BLD: 1.11 X10(3) UL (ref 0–0.7)
EOSINOPHIL NFR BLD: 7 %
ERYTHROCYTE [DISTWIDTH] IN BLOOD BY AUTOMATED COUNT: 14.3 %
GFR SERPLBLD BASED ON 1.73 SQ M-ARVRAT: 56 ML/MIN/1.73M2 (ref 60–?)
GLUCOSE BLD-MCNC: 131 MG/DL (ref 70–99)
GLUCOSE BLD-MCNC: 140 MG/DL (ref 70–99)
GLUCOSE BLD-MCNC: 246 MG/DL (ref 70–99)
GLUCOSE BLD-MCNC: 268 MG/DL (ref 70–99)
GLUCOSE BLD-MCNC: 327 MG/DL (ref 70–99)
HCT VFR BLD AUTO: 34.8 %
HGB BLD-MCNC: 11.4 G/DL
LYMPHOCYTES NFR BLD: 1.91 X10(3) UL (ref 1–4)
LYMPHOCYTES NFR BLD: 12 %
MCH RBC QN AUTO: 28.9 PG (ref 26–34)
MCHC RBC AUTO-ENTMCNC: 32.8 G/DL (ref 31–37)
MCV RBC AUTO: 88.3 FL
MONOCYTES # BLD: 0.48 X10(3) UL (ref 0.1–1)
MONOCYTES NFR BLD: 3 %
MORPHOLOGY: NORMAL
MYELOCYTES # BLD: 0.48 X10(3) UL
MYELOCYTES NFR BLD: 3 %
NEUTROPHILS # BLD AUTO: 11.61 X10 (3) UL (ref 1.5–7.7)
NEUTROPHILS NFR BLD: 70 %
NEUTS BAND NFR BLD: 5 %
NEUTS HYPERSEG # BLD: 11.93 X10(3) UL (ref 1.5–7.7)
OSMOLALITY SERPL CALC.SUM OF ELEC: 285 MOSM/KG (ref 275–295)
PHOSPHATE SERPL-MCNC: 2.6 MG/DL (ref 2.5–4.9)
PHOSPHATE SERPL-MCNC: 2.6 MG/DL (ref 2.5–4.9)
PLATELET # BLD AUTO: 293 10(3)UL (ref 150–450)
PLATELET MORPHOLOGY: NORMAL
POTASSIUM SERPL-SCNC: 3.4 MMOL/L (ref 3.5–5.1)
POTASSIUM SERPL-SCNC: 3.4 MMOL/L (ref 3.5–5.1)
RBC # BLD AUTO: 3.94 X10(6)UL
SODIUM SERPL-SCNC: 137 MMOL/L (ref 136–145)
TOTAL CELLS COUNTED BLD: 100
VANCOMYCIN TROUGH SERPL-MCNC: 14.2 UG/ML (ref 10–20)
WBC # BLD AUTO: 15.9 X10(3) UL (ref 4–11)

## 2022-08-30 PROCEDURE — 80202 ASSAY OF VANCOMYCIN: CPT | Performed by: SURGERY

## 2022-08-30 PROCEDURE — 84100 ASSAY OF PHOSPHORUS: CPT | Performed by: SURGERY

## 2022-08-30 PROCEDURE — 85025 COMPLETE CBC W/AUTO DIFF WBC: CPT | Performed by: SURGERY

## 2022-08-30 PROCEDURE — 85007 BL SMEAR W/DIFF WBC COUNT: CPT | Performed by: SURGERY

## 2022-08-30 PROCEDURE — 84132 ASSAY OF SERUM POTASSIUM: CPT | Performed by: INTERNAL MEDICINE

## 2022-08-30 PROCEDURE — 82962 GLUCOSE BLOOD TEST: CPT

## 2022-08-30 PROCEDURE — 80069 RENAL FUNCTION PANEL: CPT | Performed by: SURGERY

## 2022-08-30 PROCEDURE — 4350F CNSLNG PROVIDED SYMP MNGMNT: CPT | Performed by: SURGERY

## 2022-08-30 PROCEDURE — 85027 COMPLETE CBC AUTOMATED: CPT | Performed by: SURGERY

## 2022-08-30 RX ORDER — TRAMADOL HYDROCHLORIDE 50 MG/1
50 TABLET ORAL EVERY 8 HOURS
Status: DISCONTINUED | OUTPATIENT
Start: 2022-08-30 | End: 2022-09-02

## 2022-08-30 RX ORDER — SENNA AND DOCUSATE SODIUM 50; 8.6 MG/1; MG/1
2 TABLET, FILM COATED ORAL DAILY
Status: DISCONTINUED | OUTPATIENT
Start: 2022-08-30 | End: 2022-09-02

## 2022-08-30 RX ORDER — POTASSIUM CHLORIDE 20 MEQ/1
40 TABLET, EXTENDED RELEASE ORAL ONCE
Status: COMPLETED | OUTPATIENT
Start: 2022-08-30 | End: 2022-08-30

## 2022-08-30 NOTE — PROGRESS NOTES
Assumed patient care at [address]  Patient oriented x4 but forgetful  On tele sinus, vitals stable at room air  Pt s/p angio   l groin site c/d/i.  No hematoma noted   Rt foot wound with dressing intact    Pain management with prn norco and tramadol  Safety precautions in place   Call light is in reach   Will continue to monitor pt

## 2022-08-30 NOTE — PROGRESS NOTES
Patient refusing IV access, this RN, charge RN, and vascular access attempted to start IV. Patient refused all attempts. MD notified.

## 2022-08-30 NOTE — VASCULAR ACCESS
Consulted to place a difficult IV/Midline. Pt refused IV start at this time. Primary RN Doreen informed. Will dc consult.

## 2022-08-30 NOTE — PROCEDURES
[facility]    PATIENT'S NAME: [email or web address], LEAH   ATTENDING PHYSICIAN: Malini Dunaway MD   OPERATING PHYSICIAN: Hanane Larson M.D. 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 PROCEDURE PERFORMED:    1. Ultrasound-guided percutaneous access, left common femoral artery. 2.   Selection of right common femoral artery and angiogram, right leg. 3.   Balloon angioplasty of the anterior tibial artery with a 2.5 balloon. 4.   Balloon angioplasty of the peroneal artery with a 2.5 balloon. ASSISTANT:  Catheterization lab staff. SURGICAL FINDINGS:    1. Right common femoral artery and profunda patent. 2.   Right SFA and popliteal artery patent. 3.   Anterior tibial artery with mid segment occlusion treated with angioplasty. Peroneal with multiple high-grade stenoses treated with angioplasty. Peroneal was only patent to the distal third of the leg. SPECIMEN:  None. ESTIMATED BLOOD LOSS:  10 mL. INDICATIONS:  This is an 23-year-old female with significant history of dementia and diabetes who had gangrene and ulcers on her right foot. She had an operative debridement with an amputation for wet gangrene with Podiatry, and we are proceeding with revascularization as described below. ANESTHESIA:  Moderate conscious sedation with 2 mg of Versed and 50 mcg of fentanyl. Start time was [id number], finish was 1515 for a total of 36 minutes. DETAILS OF PROCEDURE:  Patient was taken to the catheterization lab, prepped and draped in sterile fashion.   Moderate conscious sedation was initiated, monitored by a qualified nurse under my supervision. Using ultrasound, I percutaneously accessed the left common femoral artery with a micropuncture kit. I then advanced a J-wire into the aorta, exchanged the micropuncture sheath for a 5-Finnish sheath. I then used a Crossover catheter up the aortic bifurcation and hooked the aortic bifurcation and then did an angiogram of the right common iliac and external iliac both of which were widely patent. I then advanced a Glidewire Advantage and a Crossover down to the level of the right common femoral artery and from the position of the right common femoral artery, I did an angiogram of the right lower extremity. The right common femoral and profunda were patent. Right SFA and popliteal arteries were widely patent. Anterior tibial artery with mid segment occlusion. The peroneal had multiple high-grade stenoses. 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 I then advanced the Glidewire Advantage into the superficial femoral artery and exchanged my 5-Finnish sheath for a 6-Finnish 65 cm sheath with the sheath going up and over the aortic bifurcation to the level of the mid SFA. The patient was systemically heparinized. I then exchanged the [address] for a 0.014 Runthrough wire and a Quick-Cross    Select and using the Quick-Cross and the Runthrough, I selected the anterior tibial artery. At the level of the , I had to switch to a Halberd wire and the Halberd wire easily crossed into the foot. I then selected a 2.5 balloon and balloon angioplastied the anterior tibial artery with 2 inflations. Repeat angiogram showed excellent technical result with wide patency of the anterior tibial artery. There was improved collateralization in the peroneal artery and therefore, I selected the peroneal artery and was able to advance it to the distal third of the leg.   I then advanced a 2.5 balloon to the distal third of the leg and balloon angioplastied the entire length of the anterior tibial artery to the ankle. Repeat angiogram showed an excellent technical result with wide patency of the anterior tibial artery and the peroneal artery with the peroneal only filling collaterals onto the foot. Satisfied with this result, I then removed all devices and placed a Perclose device.     Dictated By Camilla Contreras M.D.  d: 08/29/2022 15:34:08  t: 08/29/2022 34:30:02  [facility] [id number]  [facility]/

## 2022-08-30 NOTE — CONSULTS
[facility] Dosing Service    Follow-up Pharmacokinetic Consult for Vancomycin Dosing     Corrine Tello is a 78year old patient who is being treated for diabetic foot. Patient is on day 5 of vancomycin and is currently receiving 1000 mg Q 24 hours. Labs:  Lab Results   Component Value Date    CREATSERUM 0.99 08/30/2022      CrCl:  Estimated Creatinine Clearance: 31.9 mL/min (based on SCr of 0.99 mg/dL). Levels:  trough: 14.2 mcg/mL    Based on the above:    1. Continue Vancomycin at 1000 mg IVPB Q 24 hours based on pharmacokinetics and renal function. 2.  Will re-check vancomycin trough level(s) in 5-7 days. Goal trough is 10-15 mcg/mL unless otherwise noted by ordering provider. 3.  Pharmacy will order SCr as clinically indicated while on vancomycin to assess renal function. 4. Pharmacy will follow and monitor renal function, toxicity and efficacy. We appreciate the opportunity to assist in the care of this patient.     Kathe Weber, Perez  8/30/2022  2:17 PM  [address] [place] [address] Extension: [phone]

## 2022-08-31 LAB
ALBUMIN SERPL-MCNC: 2 G/DL (ref 3.4–5)
ANION GAP SERPL CALC-SCNC: 4 MMOL/L (ref 0–18)
BUN BLD-MCNC: 11 MG/DL (ref 7–18)
CALCIUM BLD-MCNC: 9.6 MG/DL (ref 8.5–10.1)
CHLORIDE SERPL-SCNC: 104 MMOL/L (ref 98–112)
CO2 SERPL-SCNC: 27 MMOL/L (ref 21–32)
CREAT BLD-MCNC: 1.16 MG/DL
GFR SERPLBLD BASED ON 1.73 SQ M-ARVRAT: 46 ML/MIN/1.73M2 (ref 60–?)
GLUCOSE BLD-MCNC: 189 MG/DL (ref 70–99)
GLUCOSE BLD-MCNC: 189 MG/DL (ref 70–99)
GLUCOSE BLD-MCNC: 214 MG/DL (ref 70–99)
GLUCOSE BLD-MCNC: 288 MG/DL (ref 70–99)
GLUCOSE BLD-MCNC: 290 MG/DL (ref 70–99)
OSMOLALITY SERPL CALC.SUM OF ELEC: 284 MOSM/KG (ref 275–295)
PHOSPHATE SERPL-MCNC: 2.1 MG/DL (ref 2.5–4.9)
POTASSIUM SERPL-SCNC: 4.1 MMOL/L (ref 3.5–5.1)
POTASSIUM SERPL-SCNC: 4.1 MMOL/L (ref 3.5–5.1)
SODIUM SERPL-SCNC: 135 MMOL/L (ref 136–145)

## 2022-08-31 PROCEDURE — 97530 THERAPEUTIC ACTIVITIES: CPT

## 2022-08-31 PROCEDURE — 82962 GLUCOSE BLOOD TEST: CPT

## 2022-08-31 PROCEDURE — 84132 ASSAY OF SERUM POTASSIUM: CPT | Performed by: HOSPITALIST

## 2022-08-31 PROCEDURE — 97161 PT EVAL LOW COMPLEX 20 MIN: CPT

## 2022-08-31 PROCEDURE — 80069 RENAL FUNCTION PANEL: CPT | Performed by: SURGERY

## 2022-08-31 RX ORDER — SULFAMETHOXAZOLE AND TRIMETHOPRIM 800; 160 MG/1; MG/1
1 TABLET ORAL EVERY 12 HOURS SCHEDULED
Status: DISCONTINUED | OUTPATIENT
Start: 2022-08-31 | End: 2022-08-31 | Stop reason: SDUPTHER

## 2022-08-31 RX ORDER — SULFAMETHOXAZOLE AND TRIMETHOPRIM 400; 80 MG/1; MG/1
1 TABLET ORAL 2 TIMES DAILY
Status: DISCONTINUED | OUTPATIENT
Start: 2022-08-31 | End: 2022-09-02

## 2022-08-31 NOTE — PROGRESS NOTES
[facility] Pharmacy Note:  Renal Adjustment for sulfamethoxazole-trimethoprim (BACTRIM)    Dustin Wong is a 78year old patient who has been prescribed sulfamethoxazole-trimethoprim (BACTRIM DS) 800/160 mg every 12 hrs. The estimated creatinine clearance is 27.2 mL/min (A) (based on SCr of 1.16 mg/dL (H)). The dose has been adjusted to sulfamethoxazole-trimethoprim (BACTRIM) 400/80 mg every 12 hrs per hospital renal dose adjustment protocol for treatment of diabetic foot. Pharmacy will follow and adjust dose as warranted for additional renal function changes.     Thank you,    Euell Councilman, [facility]  8/31/2022  12:08 PM

## 2022-08-31 NOTE — PLAN OF CARE
Assumed care at [address]. Pt a/ox4, forgetful at times. VSS. NSR per tele. RA. Tramadol scheduled and norco prn for pain management. Right foot wound dressing c/d/i. Contact isolation maintained. Pt updated with POC. Call light in reach. Needs attended to.

## 2022-08-31 NOTE — CM/SW NOTE
PT recs R[facility]. BILL spoke with son, agreeable to [facility] stay. Bill emailed list to son-- [email or web address] for son to review. Will call SW back on choice, insurance auth needed for dc. Addendum: received call from son, [facility] is first choice if denied then [facility]. BILL sent PT note in Aidin-- asked [place] to start insurance [employer].     Tammy Carlson, LSW  Discharge 2011 [facility]

## 2022-08-31 NOTE — PLAN OF CARE
Assumed patient care 0730  Patient alert and oriented X4, forgetful at times  On room air, VSS, NSR on tele  Patient managed with PRN norco and scheduyled tramadol   Left groin site C/D/I, soft, no hematoma  Up 2 max assist pivot to commode, voiding, 2 BM this shift  Rt foot wound with dressing intact    Patient has no IV access, not tolerating, refusing, MD aware  Patient updated on plan of care    Problem: PAIN - ADULT  Goal: Verbalizes/displays adequate comfort level or patient's stated pain goal  Description: INTERVENTIONS:  - Encourage pt to monitor pain and request assistance  - Assess pain using appropriate pain scale  - Administer analgesics based on type and severity of pain and evaluate response  - Implement non-pharmacological measures as appropriate and evaluate response  - Consider cultural and social influences on pain and pain management  - Manage/alleviate anxiety  - Utilize distraction and/or relaxation techniques  - Monitor for opioid side effects  - Notify MD/LIP if interventions unsuccessful or patient reports new pain  - Anticipate increased pain with activity and pre-medicate as appropriate  Outcome: Progressing     Problem: Diabetes/Glucose Control  Goal: Glucose maintained within prescribed range  Description: INTERVENTIONS:  - Monitor Blood Glucose as ordered  - Assess for signs and symptoms of hyperglycemia and hypoglycemia  - Administer ordered medications to maintain glucose within target range  - Assess barriers to adequate nutritional intake and initiate nutrition consult as needed  - Instruct patient on self management of diabetes  Outcome: Progressing

## 2022-09-01 LAB
ALBUMIN SERPL-MCNC: 2.2 G/DL (ref 3.4–5)
ANION GAP SERPL CALC-SCNC: 7 MMOL/L (ref 0–18)
BUN BLD-MCNC: 14 MG/DL (ref 7–18)
CALCIUM BLD-MCNC: 9.8 MG/DL (ref 8.5–10.1)
CHLORIDE SERPL-SCNC: 102 MMOL/L (ref 98–112)
CO2 SERPL-SCNC: 25 MMOL/L (ref 21–32)
CREAT BLD-MCNC: 1.42 MG/DL
GFR SERPLBLD BASED ON 1.73 SQ M-ARVRAT: 36 ML/MIN/1.73M2 (ref 60–?)
GLUCOSE BLD-MCNC: 265 MG/DL (ref 70–99)
GLUCOSE BLD-MCNC: 284 MG/DL (ref 70–99)
GLUCOSE BLD-MCNC: 300 MG/DL (ref 70–99)
GLUCOSE BLD-MCNC: 316 MG/DL (ref 70–99)
GLUCOSE BLD-MCNC: 370 MG/DL (ref 70–99)
OSMOLALITY SERPL CALC.SUM OF ELEC: 289 MOSM/KG (ref 275–295)
PHOSPHATE SERPL-MCNC: 2.2 MG/DL (ref 2.5–4.9)
PHOSPHATE SERPL-MCNC: 2.2 MG/DL (ref 2.5–4.9)
POTASSIUM SERPL-SCNC: 4.2 MMOL/L (ref 3.5–5.1)
SODIUM SERPL-SCNC: 134 MMOL/L (ref 136–145)

## 2022-09-01 PROCEDURE — 97165 OT EVAL LOW COMPLEX 30 MIN: CPT

## 2022-09-01 PROCEDURE — 84100 ASSAY OF PHOSPHORUS: CPT | Performed by: HOSPITALIST

## 2022-09-01 PROCEDURE — 97535 SELF CARE MNGMENT TRAINING: CPT

## 2022-09-01 PROCEDURE — 82962 GLUCOSE BLOOD TEST: CPT

## 2022-09-01 PROCEDURE — 80069 RENAL FUNCTION PANEL: CPT | Performed by: SURGERY

## 2022-09-01 NOTE — PROGRESS NOTES
[facility] Palliative Liaison received palliative referral for community PC services. Ileana Mcgrath with son Brent Reed via phone to discuss Residential PC services. Residential PC services discussed and is agreeable to our [facility] upon DC to [facility].        Kori Reyna  Residential Palliative Liaison  [phone]

## 2022-09-01 NOTE — PLAN OF CARE
Assumed care at 0700. Patient alert and oriented x 4. Calm and cooperative with care. Vital signs WNL and stable. HR NSR on monitor.  Patient on RA  No vascular access  Right foot dressing changed by wound care RN  Tolerated well with scheduled Ultram  Call light within reach  Awaiting insurance approval for SARs  Will continue to monitor

## 2022-09-01 NOTE — CM/SW NOTE
SW sent Community PC referral in Aidin to [facility]idential. Aware that pt will dc to [facility]-- insurance auth pending. SW to provide updates on auth status to RN, pt/family.      JOHN Winkler  Discharge 2011 [facility]

## 2022-09-01 NOTE — PLAN OF CARE
Received pt at 1930,alert and  Oriented, vss wnl, pain managed with scheduled tramadol, NSR on tele, RA, wound dressing to rt foot C/D/I, POC discussed with pt, sleeping in a low bed with call light within reach, will continue to monitor.

## 2022-09-01 NOTE — PROGRESS NOTES
[facility] Palliative Liaison received palliative referral for community PC services. Waiting to obtain insurance (verification/authorization) prior to pursuing.        Kori Reyna  Residential Palliative Liaison   [phone]

## 2022-09-02 VITALS
HEART RATE: 106 BPM | TEMPERATURE: 99 F | BODY MASS INDEX: 26 KG/M2 | OXYGEN SATURATION: 98 % | RESPIRATION RATE: 18 BRPM | SYSTOLIC BLOOD PRESSURE: 122 MMHG | WEIGHT: 140 LBS | DIASTOLIC BLOOD PRESSURE: 71 MMHG

## 2022-09-02 LAB
ALBUMIN SERPL-MCNC: 2.2 G/DL (ref 3.4–5)
ANION GAP SERPL CALC-SCNC: 6 MMOL/L (ref 0–18)
BUN BLD-MCNC: 16 MG/DL (ref 7–18)
CALCIUM BLD-MCNC: 10.2 MG/DL (ref 8.5–10.1)
CHLORIDE SERPL-SCNC: 101 MMOL/L (ref 98–112)
CO2 SERPL-SCNC: 27 MMOL/L (ref 21–32)
CREAT BLD-MCNC: 1.45 MG/DL
GFR SERPLBLD BASED ON 1.73 SQ M-ARVRAT: 36 ML/MIN/1.73M2 (ref 60–?)
GLUCOSE BLD-MCNC: 209 MG/DL (ref 70–99)
GLUCOSE BLD-MCNC: 219 MG/DL (ref 70–99)
GLUCOSE BLD-MCNC: 310 MG/DL (ref 70–99)
OSMOLALITY SERPL CALC.SUM OF ELEC: 286 MOSM/KG (ref 275–295)
PHOSPHATE SERPL-MCNC: 2.5 MG/DL (ref 2.5–4.9)
POTASSIUM SERPL-SCNC: 4.2 MMOL/L (ref 3.5–5.1)
SARS-COV-2 RNA RESP QL NAA+PROBE: NOT DETECTED
SODIUM SERPL-SCNC: 134 MMOL/L (ref 136–145)

## 2022-09-02 PROCEDURE — 82962 GLUCOSE BLOOD TEST: CPT

## 2022-09-02 PROCEDURE — 80069 RENAL FUNCTION PANEL: CPT | Performed by: SURGERY

## 2022-09-02 RX ORDER — PANTOPRAZOLE SODIUM 40 MG/1
40 TABLET, DELAYED RELEASE ORAL
Refills: 0 | Status: SHIPPED | COMMUNITY
Start: 2022-09-03

## 2022-09-02 RX ORDER — CLOPIDOGREL BISULFATE 75 MG/1
75 TABLET ORAL DAILY
Qty: 30 TABLET | Refills: 0 | Status: SHIPPED | COMMUNITY
Start: 2022-09-03

## 2022-09-02 RX ORDER — DULOXETIN HYDROCHLORIDE 20 MG/1
20 CAPSULE, DELAYED RELEASE ORAL 2 TIMES DAILY
Qty: 60 CAPSULE | Refills: 0 | Status: SHIPPED | COMMUNITY
Start: 2022-09-02

## 2022-09-02 RX ORDER — TRAMADOL HYDROCHLORIDE 50 MG/1
50 TABLET ORAL EVERY 8 HOURS PRN
Qty: 30 TABLET | Refills: 0 | Status: SHIPPED | OUTPATIENT
Start: 2022-09-02

## 2022-09-02 RX ORDER — METRONIDAZOLE 250 MG/1
250 TABLET ORAL EVERY 8 HOURS SCHEDULED
Qty: 27 TABLET | Refills: 0 | Status: SHIPPED | COMMUNITY
Start: 2022-09-02

## 2022-09-02 RX ORDER — SULFAMETHOXAZOLE AND TRIMETHOPRIM 400; 80 MG/1; MG/1
1 TABLET ORAL 2 TIMES DAILY
Qty: 18 TABLET | Refills: 0 | Status: SHIPPED | COMMUNITY
Start: 2022-09-02

## 2022-09-02 NOTE — CM/SW NOTE
[facility] Able to dc today, will need rapid covid test prior to dc. SW notified RN. SW called son, aware of dc plan. [facility] transport set up for 3:30, PCS completed    RN to call [phone] for report.      JOHN Villanueva  Discharge Planner  [id number]'

## 2022-09-02 NOTE — PLAN OF CARE
Assumed care at 0700. Patient alert and oriented x 4. Calm and cooperative with care. Vital signs WNL and stable. HR NSR on monitor. Patient on RA  No vascular access  Right foot dressing changed  Tolerated well with scheduled Ultram    Discharge navigator complete. Discussed medications and POC with [facility]. All questions answered.     Problem: Diabetes/Glucose Control  Goal: Glucose maintained within prescribed range  Description: INTERVENTIONS:  - Monitor Blood Glucose as ordered  - Assess for signs and symptoms of hyperglycemia and hypoglycemia  - Administer ordered medications to maintain glucose within target range  - Assess barriers to adequate nutritional intake and initiate nutrition consult as needed  - Instruct patient on self management of diabetes  Outcome: Adequate for Discharge     Problem: Patient/Family Goals  Goal: Patient/Family Long Term Goal  Description: Patient's Long Term Goal: Visit friend    Interventions:  - daily walks  - See additional Care Plan goals for specific interventions  Outcome: Adequate for Discharge  Goal: Patient/Family Short Term Goal  Description: Patient's Short Term Goal: Heal from sx    Interventions:   - Daily dressing changes & Iv abx  - See additional Care Plan goals for specific interventions  Outcome: Adequate for Discharge     Problem: PAIN - ADULT  Goal: Verbalizes/displays adequate comfort level or patient's stated pain goal  Description: INTERVENTIONS:  - Encourage pt to monitor pain and request assistance  - Assess pain using appropriate pain scale  - Administer analgesics based on type and severity of pain and evaluate response  - Implement non-pharmacological measures as appropriate and evaluate response  - Consider cultural and social influences on pain and pain management  - Manage/alleviate anxiety  - Utilize distraction and/or relaxation techniques  - Monitor for opioid side effects  - Notify MD/LIP if interventions unsuccessful or patient reports new pain  - Anticipate increased pain with activity and pre-medicate as appropriate  Outcome: Adequate for Discharge

## 2022-09-02 NOTE — CM/SW NOTE
Care Progression Note:  Length of stay: 9  GMLOS: 4.3  Avoidable Delays:   Code Status: DNAR/Select    Acute Medical Issue/Factors:   Diabetic foot [facility])       Discharge Barriers: insurance auth  Expected discharge date: today   Expected next site of care: insurance auth approved for the [facility] today    / available for discharge planning.

## 2022-09-02 NOTE — CM/SW NOTE
Bill sent updates in Aidin to [facility]. Message sent to liaison,  Insurance still pending for [facility]. BILL/JUAN F to remain available.      JOHN Aguero  Discharge 2011 [facility]

## 2022-09-02 NOTE — PLAN OF CARE
Assumed care. Patient resting in bed. Declined repositioning. Offered several times throughout the night and patient repositioned self and declined staff help. Pain medication given as ordered, along with ES tylenol. Awaiting discharge to [facility].

## 2022-09-07 ENCOUNTER — INITIAL APN SNF VISIT (OUTPATIENT)
Dept: INTERNAL MEDICINE CLINIC | Age: 82
End: 2022-09-07

## 2022-09-07 VITALS
TEMPERATURE: 97 F | HEART RATE: 94 BPM | RESPIRATION RATE: 16 BRPM | DIASTOLIC BLOOD PRESSURE: 69 MMHG | OXYGEN SATURATION: 98 % | BODY MASS INDEX: 29 KG/M2 | SYSTOLIC BLOOD PRESSURE: 113 MMHG | WEIGHT: 151.13 LBS

## 2022-09-07 DIAGNOSIS — E11.8 DIABETIC FOOT (HCC): ICD-10-CM

## 2022-09-07 DIAGNOSIS — I10 ESSENTIAL HYPERTENSION: ICD-10-CM

## 2022-09-07 DIAGNOSIS — I96 GANGRENE (HCC): ICD-10-CM

## 2022-09-07 DIAGNOSIS — N18.30 STAGE 3 CHRONIC KIDNEY DISEASE, UNSPECIFIED WHETHER STAGE 3A OR 3B CKD (HCC): ICD-10-CM

## 2022-09-07 DIAGNOSIS — IMO0002: ICD-10-CM

## 2022-09-07 DIAGNOSIS — I73.9 PVD (PERIPHERAL VASCULAR DISEASE) (HCC): ICD-10-CM

## 2022-09-07 DIAGNOSIS — M54.9 ACUTE MIDLINE BACK PAIN, UNSPECIFIED BACK LOCATION: ICD-10-CM

## 2022-09-07 DIAGNOSIS — M86.9 OSTEOMYELITIS OF RIGHT FOOT, UNSPECIFIED TYPE (HCC): Primary | ICD-10-CM

## 2022-09-07 DIAGNOSIS — R73.9 HYPERGLYCEMIA: ICD-10-CM

## 2022-09-07 DIAGNOSIS — G62.9 NEUROPATHY: ICD-10-CM

## 2022-09-07 DIAGNOSIS — R53.1 WEAKNESS: ICD-10-CM

## 2022-09-07 NOTE — PROGRESS NOTES
[facility] Author: WILLIE Torres     3/12/1940 MRN [id number]   [facility][1]  Admission 22      Last Hospital Discharge 22 PCP Patrisha Lanes, MD     HPI:      Adelita Reza is a 78year old female admitted to SNF for sub-acute rehabilitation. She has previous medical history including HTN, HLD, PVD, CKD, DM with neuropathy. She was admitted with weakness and diagnosed with osteomyelitis, gangrene. She had I&D with open 2nd ray amputation, + MRSA and IV antibiotics. Vascular surgery for PVD and balloon angioplasty. Podiatry recommended AKA pt refused. She then declined any further IV abx and was converted to PO. Renal following for CODY on CKD. She is seen sitting up in the wheelchair today. She has been taking 2 tylenol #3 plus Tramadol QID for 36 years for chronic back pain, she states. Doesn't understand why she is not on it now. She has no other concerns. Therapy states she is ambulating independently on her RLE and she is to be Non weight bearing, reinforced with patient. Hospital Discharge Diagnoses:  OM  gangrene  cody on ckd  DM  Hyponatremia  leukocytosis  Chronic back pain    Chief Complaint at visit:   Patient presents with: Follow - Up: right foot wound, gangrene, s/p amputation, pain weakness, NWB       ALLERGIES    She is allergic to anas platyrhynchos feather (duck) allergy test, milk-related compounds, and varicella zoster immune globulin. CURRENT MEDS:    clopidogrel 75 MG Oral Tab, Take 1 tablet (75 mg total) by mouth daily. DULoxetine 20 MG Oral Cap DR Particles, Take 1 capsule (20 mg total) by mouth 2 (two) times a day. insulin aspart 100 UNIT/ML Subcutaneous Solution Pen-injector, Inject 2-10 Units into the skin TID CC and HS. insulin detemir 100 UNIT/ML Subcutaneous Solution Pen-injector, Inject 5 Units into the skin daily.   sulfamethoxazole-trimethoprim 400-80 MG Oral Tab, Take 1 tablet by mouth 2 (two) times a day. metRONIDAZOLE 250 MG Oral Tab, Take 1 tablet (250 mg total) by mouth every 8 (eight) hours. traMADol 50 MG Oral Tab, Take 1 tablet (50 mg total) by mouth every 8 (eight) hours as needed for Pain.  pantoprazole 40 MG Oral Tab EC, Take 1 tablet (40 mg total) by mouth every morning before breakfast.  aspirin 81 MG Oral Tab EC, Take 81 mg by mouth daily. ferrous sulfate 325 (65 FE) MG Oral Tab EC, Take 325 mg by mouth daily with breakfast.  Lovastatin 20 MG Oral Tab, Take 20 mg by mouth nightly. empagliflozin 10 MG Oral Tab, Take 10 mg by mouth daily. Magnesium Oxide 250 MG Oral Tab, Take 500 mg by mouth daily. Multiple Vitamins-Minerals (MULTI-VITAMIN/MINERALS) Oral Tab, Take 1 tablet by mouth daily. cyanocobalamine 1000 MCG Oral Tab, Take 1,000 mcg by mouth daily. Ascorbic Acid (VITAMIN C) 1000 MG Oral Tab, Take 1,000 mg by mouth daily. zinc sulfate 220 (50 Zn) MG Oral Cap, Take 220 mg by mouth daily. amLODIPine 5 MG Oral Tab, Take 5 mg by mouth daily. furosemide 40 MG Oral Tab, Take 40 mg by mouth daily. glimepiride 4 MG Oral Tab, Take 8 mg by mouth every morning before breakfast.    No current facility-administered medications on file prior to visit. HISTORY:    She  has a past medical history of Depression, Diabetes ([facility] [address] High blood pressure, Peripheral vascular disease ([facility] [address] and Renal disorder. She  has no past surgical history on file.       CODE STATUS VERIFIED: DNR, Son  Sabina Bustos is POA    SUBJECTIVE/ ROS:       REVIEW OF SYSTEMS:  GENERAL HEALTH:persistent pain  SKIN: denies any unusual skin lesions or rashes  WOUNDS: no wounds reported, rehab nurse to complete head to toe assessment ; surgical incision at right foot  EYES:no visual complaints  HENT: denies nasal congestion, sinus pain or sore throat  RESPIRATORY: denies shortness of breath, wheezing or cough  CARDIOVASCULAR: no complaints  GI: diarrhea  :no urinary complaints; no dysuria, urgency or frequency   MUSCULOSKELETAL:back pain right foot pain  NEURO:peripheral neuropathy   PSYCHE: no complaints of depression or anxiety   HEMATOLOGY:no history of anemia, LORRAINE or B12 deficiency      *This patient will undergo PT/OT evaluation with treatment as needed. *Skilled nursing care for wound care     Therapy progress update:  Noted Ambulating and bearing weight on RLE< NWB RLE, reinforced with patient  Mod assist for transfers  Not ambulating with therapy as is NWB RLE  DC planning [employer] managed care updates, from [address]:     Vital signs reviewed in [facility] EMR. Hospital and rehab lab results and imaging reviewed as available.     Vitals History 9/2/2022 9/7/2022   BP - 113/69   BP Location - -   Pulse 106 94   Resp - 16   Temp - 97   SpO2 98 98   Weight - 151 lbs 2 oz   Height - -   BODY MASS INDEX - 28.55          Lab Results   Component Value Date    WBC 12.4 (H) 09/06/2022    RBC 3.70 (L) 09/06/2022    HGB 10.7 (L) 09/06/2022    HCT 33.5 (L) 09/06/2022    .0 09/06/2022    MCV 90.5 09/06/2022    MCH 28.9 09/06/2022    MCHC 31.9 09/06/2022    RDW 15.2 09/06/2022    NEPRELIM 8.66 (H) 09/06/2022    NEUTABS 11.93 (H) 08/30/2022    LYMPHABS 1.91 08/30/2022    EOSABS 1.11 (H) 08/30/2022    BASABS 0.00 08/30/2022    NEUT 70 08/30/2022    LYMPH 12 08/30/2022    MON 3 08/30/2022    EOS 7 08/30/2022    BASO 0 08/30/2022    NEPERCENT 69.8 09/06/2022    LYPERCENT 14.4 09/06/2022    MOPERCENT 8.1 09/06/2022    EOPERCENT 3.3 09/06/2022    BAPERCENT 0.9 09/06/2022    NE 8.66 (H) 09/06/2022    LYMABS 1.78 09/06/2022    MOABSO 1.01 (H) 09/06/2022    EOABSO 0.41 09/06/2022    BAABSO 0.11 09/06/2022       Lab Results   Component Value Date     (H) 09/06/2022    BUN 22 (H) 09/06/2022    BUNCREA 15.2 07/16/2021    CREATSERUM 1.73 (H) 09/06/2022    ANIONGAP 5 09/06/2022    GFRNAA 34 (L) 01/06/2022    GFRAA 39 (L) 01/06/2022    CA 9.5 09/06/2022    OSMOCALC 290 09/06/2022    ALKPHO 103 09/06/2022 AST 15 09/06/2022    ALT 12 (L) 09/06/2022    BILT 0.3 09/06/2022    TP 5.7 (L) 09/06/2022    ALB 2.5 (L) 09/06/2022    GLOBULIN 3.2 09/06/2022     09/06/2022    K 4.7 09/06/2022     09/06/2022    CO2 28.0 09/06/2022       ASSESSMENT/ PHYSICAL EXAM:     GENERAL HEALTH: petite, elderly patient  and well developed, appears well nourished, in no apparent distress  LINES, TUBES, DRAINS: none  SKIN: warm, dry  WOUND: right foot with surgical dressing in place, boot on  EYES: conjunctiva normal, no drainage from eyes  HENT: normocephalic; normal nose, no nasal drainage, mucous membranes pink, moist  RESPIRATORY: clear to auscultation and diminished to bases, no wheezes, no rhonchi, RA  CARDIOVASCULAR: S1, S2 normal, RRR, no murmur, no edema  ABDOMEN: active BS+, soft, non-distended; no visible masses; non-tender, no guarding  : deferred  MUSCULOSKELETAL: weakness generalized, right foot in boot post op, toes with +CMS  EXTREMITIES/VASCULAR: no cyanosis or edema noted   NEUROLOGIC: intact; no sensorimotor deficit, follows commands, A&OX3  PSYCHIATRIC: calm, cooperative, affect appropriate       MEDICAL DECISION MAKING and PLAN OF CARE:     Osteomyelitis, gangrene s.p I&D and 2nd ray amputation, PVD s/p angioplasty and stent  Had Podiatry recommend an AKA, she declined and had ray amputation  Was on IV abx, declined reinsert of IV, now on PO  Wound care following for dressing changes  Plavix 75 mg daily  Metronidazole 250 mg PO Q8 hours until 9/11  Bactrim SS BID until 9/11, renal dosed, monitoring BMP creat rising  Aspirin 81 mg daily  Follow ups with Podiatry, vascular, ID prn  Palliative care recommended inpt for unlikely to heal wound  NWB Right foot  Dressing change q day with betadine and packed wet to dry    CODY on CKD III  Baseline creat 1.4-1.79  Now 1.73 on Bactrim, repeat tomorrow  Avoid nephrotoxic medications  Renal dose meds as needed  CMP weekly and prn  Follow up with nephrology as needed    DM type II with peripheral neuropathy  Levemir 5 units daily increase to 8 units daily  ISS 2-10 units  JArdiance 10 mg daily  glimiperide 8 mg daily  A1C 14.1  Fasting 233-297, preprandial 177-434  Monitor and titrate as needed    HTN, HLD  Amlodipine 5 mg daily  Furosemide 40 mg daily  Lovastatin 20 mg nightly  BP controlled    Hyponatremia  Fluid restriction 1.5L/day  On lasix  Na level 136 now    Leukocytosis  WBC trending down 12.2 now  On antibiotics for gangrene, osteomyelitis  Monitoring    Anemia  Hgb 10.7  Monitoring post op on aspirin and plavis  On iron, B12 MVI  Repeat CBC tomorrow    Chronic back pain  On tylenol #3 and Tramadol at home  Just on Tramadol 50 mg PO Q6 prn  Add Tylenol 650 mg PO Q6 prn  Duloxetine added  Per notes, family concerned about drug seeking behaviors, avoid narcotics  Monitoring  Palliative care following    Physical Deconditioning/Weakness; at risk for falling  Fall Precautions, NWB  PT/OT/ST to evaluate and treat  RACH team to establish care plan meeting with patient and POA/family as appropriate  RACH team/ & discharge planner to assist with establishing safe discharge plan for next level of care  Anticipated DC date: ongoing, [employer] for updates now LCD 9/10 with DC 9/11  From [facility]     DVT Prophylaxis   Encourage exercise and participation with therapy as much as able    GI Prophylaxis  pantoprazole    Pain Management  Tramadol Q 6prn  Tylenol 650 Q6 prn  Avoid narcotics as able  Palliative care following  Offer to pre-medicate 30-60 min prior to therapy  Physiatry evaluation with management appreciated      Bowel Management Regimen/Constipation   Currently with diarrhea  Monitoring     Vitamins/supplements as r/t deficiencies  RACH RD to follow while in rehab; supplementation/diet as per RACH RD  May continue home supplements  B12  Iron  Mag ox  MVI  Vit C   zinc      *Follow-Up with PCP within 1-2 weeks following RACH discharge.    *Follow-Up with specialists as recommended. ID prn  Podiatry prn Dr Juan M Wiley, as needed  Vascular surgery Dr Pili Huffman spent w/ patient and reviewing medical records, labs, completing medication reconciliation and entering orders to establish plan of care in [facility]. This dictation was performed with a verbal recognition program (DRAGON) and it was checked for errors. It is possible that there are still dictated errors within this note. If so, please bring any errors to my attention for an addendum. All efforts were made to ensure that this note is accurate.     Quentin Dean, APRN  09/07/22

## 2023-02-12 ENCOUNTER — APPOINTMENT (OUTPATIENT)
Dept: GENERAL RADIOLOGY | Facility: HOSPITAL | Age: 83
End: 2023-02-12
Attending: EMERGENCY MEDICINE
Payer: MEDICARE

## 2023-02-12 ENCOUNTER — HOSPITAL ENCOUNTER (INPATIENT)
Facility: HOSPITAL | Age: 83
LOS: 3 days | Discharge: HOSPICE | End: 2023-02-15
Attending: EMERGENCY MEDICINE | Admitting: INTERNAL MEDICINE
Payer: MEDICARE

## 2023-02-12 ENCOUNTER — APPOINTMENT (OUTPATIENT)
Dept: CT IMAGING | Facility: HOSPITAL | Age: 83
End: 2023-02-12
Attending: EMERGENCY MEDICINE
Payer: MEDICARE

## 2023-02-12 DIAGNOSIS — R73.9 HYPERGLYCEMIA: ICD-10-CM

## 2023-02-12 DIAGNOSIS — L89.151 PRESSURE INJURY OF SACRAL REGION, STAGE 1: ICD-10-CM

## 2023-02-12 DIAGNOSIS — M86.371 CHRONIC MULTIFOCAL OSTEOMYELITIS OF RIGHT FOOT (HCC): Primary | ICD-10-CM

## 2023-02-12 DIAGNOSIS — N30.00 ACUTE CYSTITIS WITHOUT HEMATURIA: ICD-10-CM

## 2023-02-12 DIAGNOSIS — E11.8 DIABETIC FOOT (HCC): ICD-10-CM

## 2023-02-12 LAB
ALBUMIN SERPL-MCNC: 2.8 G/DL (ref 3.4–5)
ALBUMIN/GLOB SERPL: 0.9 {RATIO} (ref 1–2)
ALP LIVER SERPL-CCNC: 130 U/L
ALT SERPL-CCNC: 18 U/L
ANION GAP SERPL CALC-SCNC: 12 MMOL/L (ref 0–18)
AST SERPL-CCNC: 21 U/L (ref 15–37)
BASOPHILS # BLD AUTO: 0.06 X10(3) UL (ref 0–0.2)
BASOPHILS NFR BLD AUTO: 0.5 %
BILIRUB SERPL-MCNC: 0.6 MG/DL (ref 0.1–2)
BILIRUB UR QL STRIP.AUTO: NEGATIVE
BUN BLD-MCNC: 35 MG/DL (ref 7–18)
CALCIUM BLD-MCNC: 8.9 MG/DL (ref 8.5–10.1)
CHLORIDE SERPL-SCNC: 86 MMOL/L (ref 98–112)
CO2 SERPL-SCNC: 28 MMOL/L (ref 21–32)
CREAT BLD-MCNC: 1.6 MG/DL
EOSINOPHIL # BLD AUTO: 0 X10(3) UL (ref 0–0.7)
EOSINOPHIL NFR BLD AUTO: 0 %
ERYTHROCYTE [DISTWIDTH] IN BLOOD BY AUTOMATED COUNT: 14.6 %
GFR SERPLBLD BASED ON 1.73 SQ M-ARVRAT: 32 ML/MIN/1.73M2 (ref 60–?)
GLOBULIN PLAS-MCNC: 3.2 G/DL (ref 2.8–4.4)
GLUCOSE BLD-MCNC: 407 MG/DL (ref 70–99)
GLUCOSE BLD-MCNC: 501 MG/DL (ref 70–99)
GLUCOSE BLD-MCNC: 684 MG/DL (ref 70–99)
GLUCOSE UR STRIP.AUTO-MCNC: >=500 MG/DL
HCT VFR BLD AUTO: 35.6 %
HGB BLD-MCNC: 12.2 G/DL
IMM GRANULOCYTES # BLD AUTO: 0.17 X10(3) UL (ref 0–1)
IMM GRANULOCYTES NFR BLD: 1.3 %
LYMPHOCYTES # BLD AUTO: 0.89 X10(3) UL (ref 1–4)
LYMPHOCYTES NFR BLD AUTO: 7 %
MCH RBC QN AUTO: 27.6 PG (ref 26–34)
MCHC RBC AUTO-ENTMCNC: 34.3 G/DL (ref 31–37)
MCV RBC AUTO: 80.5 FL
MONOCYTES # BLD AUTO: 0.89 X10(3) UL (ref 0.1–1)
MONOCYTES NFR BLD AUTO: 7 %
NEUTROPHILS # BLD AUTO: 10.74 X10 (3) UL (ref 1.5–7.7)
NEUTROPHILS # BLD AUTO: 10.74 X10(3) UL (ref 1.5–7.7)
NEUTROPHILS NFR BLD AUTO: 84.2 %
NITRITE UR QL STRIP.AUTO: NEGATIVE
OSMOLALITY SERPL CALC.SUM OF ELEC: 303 MOSM/KG (ref 275–295)
PH UR STRIP.AUTO: 6 [PH] (ref 5–8)
PLATELET # BLD AUTO: 225 10(3)UL (ref 150–450)
POTASSIUM SERPL-SCNC: 3.8 MMOL/L (ref 3.5–5.1)
PROT SERPL-MCNC: 6 G/DL (ref 6.4–8.2)
PROT UR STRIP.AUTO-MCNC: NEGATIVE MG/DL
RBC # BLD AUTO: 4.42 X10(6)UL
SARS-COV-2 RNA RESP QL NAA+PROBE: NOT DETECTED
SODIUM SERPL-SCNC: 126 MMOL/L (ref 136–145)
SP GR UR STRIP.AUTO: 1.02 (ref 1–1.03)
UROBILINOGEN UR STRIP.AUTO-MCNC: <2 MG/DL
WBC # BLD AUTO: 12.8 X10(3) UL (ref 4–11)
WBC #/AREA URNS AUTO: >50 /HPF

## 2023-02-12 PROCEDURE — 73630 X-RAY EXAM OF FOOT: CPT | Performed by: EMERGENCY MEDICINE

## 2023-02-12 PROCEDURE — 87186 SC STD MICRODIL/AGAR DIL: CPT | Performed by: EMERGENCY MEDICINE

## 2023-02-12 PROCEDURE — 99285 EMERGENCY DEPT VISIT HI MDM: CPT

## 2023-02-12 PROCEDURE — 96365 THER/PROPH/DIAG IV INF INIT: CPT

## 2023-02-12 PROCEDURE — 82962 GLUCOSE BLOOD TEST: CPT

## 2023-02-12 PROCEDURE — 87106 FUNGI IDENTIFICATION YEAST: CPT | Performed by: EMERGENCY MEDICINE

## 2023-02-12 PROCEDURE — 70450 CT HEAD/BRAIN W/O DYE: CPT | Performed by: EMERGENCY MEDICINE

## 2023-02-12 PROCEDURE — 72100 X-RAY EXAM L-S SPINE 2/3 VWS: CPT | Performed by: EMERGENCY MEDICINE

## 2023-02-12 PROCEDURE — 81001 URINALYSIS AUTO W/SCOPE: CPT | Performed by: EMERGENCY MEDICINE

## 2023-02-12 PROCEDURE — 87147 CULTURE TYPE IMMUNOLOGIC: CPT | Performed by: EMERGENCY MEDICINE

## 2023-02-12 PROCEDURE — 87205 SMEAR GRAM STAIN: CPT | Performed by: EMERGENCY MEDICINE

## 2023-02-12 PROCEDURE — 87077 CULTURE AEROBIC IDENTIFY: CPT | Performed by: EMERGENCY MEDICINE

## 2023-02-12 PROCEDURE — 87086 URINE CULTURE/COLONY COUNT: CPT | Performed by: EMERGENCY MEDICINE

## 2023-02-12 PROCEDURE — 87070 CULTURE OTHR SPECIMN AEROBIC: CPT | Performed by: EMERGENCY MEDICINE

## 2023-02-12 PROCEDURE — 83036 HEMOGLOBIN GLYCOSYLATED A1C: CPT | Performed by: INTERNAL MEDICINE

## 2023-02-12 PROCEDURE — 85025 COMPLETE CBC W/AUTO DIFF WBC: CPT | Performed by: EMERGENCY MEDICINE

## 2023-02-12 PROCEDURE — 80053 COMPREHEN METABOLIC PANEL: CPT | Performed by: EMERGENCY MEDICINE

## 2023-02-12 RX ORDER — PANTOPRAZOLE SODIUM 40 MG/1
40 TABLET, DELAYED RELEASE ORAL
Status: DISCONTINUED | OUTPATIENT
Start: 2023-02-13 | End: 2023-02-15

## 2023-02-12 RX ORDER — SODIUM CHLORIDE 9 MG/ML
INJECTION, SOLUTION INTRAVENOUS CONTINUOUS
Status: DISCONTINUED | OUTPATIENT
Start: 2023-02-12 | End: 2023-02-15

## 2023-02-12 RX ORDER — POTASSIUM CHLORIDE 20 MEQ/1
40 TABLET, EXTENDED RELEASE ORAL ONCE
Status: DISCONTINUED | OUTPATIENT
Start: 2023-02-12 | End: 2023-02-15

## 2023-02-12 RX ORDER — DULOXETIN HYDROCHLORIDE 20 MG/1
20 CAPSULE, DELAYED RELEASE ORAL 2 TIMES DAILY
Status: DISCONTINUED | OUTPATIENT
Start: 2023-02-12 | End: 2023-02-15

## 2023-02-12 RX ORDER — NICOTINE POLACRILEX 4 MG
30 LOZENGE BUCCAL
Status: DISCONTINUED | OUTPATIENT
Start: 2023-02-12 | End: 2023-02-15

## 2023-02-12 RX ORDER — LOPERAMIDE HYDROCHLORIDE 2 MG/1
2 CAPSULE ORAL 4 TIMES DAILY PRN
COMMUNITY

## 2023-02-12 RX ORDER — MELATONIN
1000 DAILY
Status: DISCONTINUED | OUTPATIENT
Start: 2023-02-12 | End: 2023-02-15

## 2023-02-12 RX ORDER — ASPIRIN 81 MG/1
81 TABLET ORAL DAILY
Status: DISCONTINUED | OUTPATIENT
Start: 2023-02-12 | End: 2023-02-15

## 2023-02-12 RX ORDER — LORAZEPAM 0.5 MG/1
0.5 TABLET ORAL 2 TIMES DAILY
COMMUNITY

## 2023-02-12 RX ORDER — ACETAMINOPHEN AND CODEINE PHOSPHATE 300; 30 MG/1; MG/1
1 TABLET ORAL EVERY 4 HOURS PRN
COMMUNITY

## 2023-02-12 RX ORDER — ZINC SULFATE 50(220)MG
220 CAPSULE ORAL DAILY
Status: DISCONTINUED | OUTPATIENT
Start: 2023-02-12 | End: 2023-02-15

## 2023-02-12 RX ORDER — PRAVASTATIN SODIUM 20 MG
20 TABLET ORAL NIGHTLY
Status: DISCONTINUED | OUTPATIENT
Start: 2023-02-12 | End: 2023-02-15

## 2023-02-12 RX ORDER — MORPHINE SULFATE 4 MG/ML
4 INJECTION, SOLUTION INTRAMUSCULAR; INTRAVENOUS EVERY 2 HOUR PRN
Status: DISCONTINUED | OUTPATIENT
Start: 2023-02-12 | End: 2023-02-15

## 2023-02-12 RX ORDER — MORPHINE SULFATE 2 MG/ML
1 INJECTION, SOLUTION INTRAMUSCULAR; INTRAVENOUS EVERY 2 HOUR PRN
Status: DISCONTINUED | OUTPATIENT
Start: 2023-02-12 | End: 2023-02-15

## 2023-02-12 RX ORDER — GABAPENTIN 100 MG/1
100 CAPSULE ORAL NIGHTLY
COMMUNITY

## 2023-02-12 RX ORDER — HALOPERIDOL 1 MG/1
1 TABLET ORAL EVERY 6 HOURS PRN
COMMUNITY

## 2023-02-12 RX ORDER — MELATONIN
3 NIGHTLY PRN
Status: DISCONTINUED | OUTPATIENT
Start: 2023-02-12 | End: 2023-02-15

## 2023-02-12 RX ORDER — ASCORBIC ACID 500 MG
1000 TABLET ORAL DAILY
Status: DISCONTINUED | OUTPATIENT
Start: 2023-02-12 | End: 2023-02-15

## 2023-02-12 RX ORDER — CLOPIDOGREL BISULFATE 75 MG/1
75 TABLET ORAL DAILY
Status: DISCONTINUED | OUTPATIENT
Start: 2023-02-12 | End: 2023-02-15

## 2023-02-12 RX ORDER — ACETAMINOPHEN 325 MG/1
650 TABLET ORAL EVERY 6 HOURS PRN
COMMUNITY

## 2023-02-12 RX ORDER — SENNOSIDES 8.6 MG
17.2 TABLET ORAL NIGHTLY PRN
Status: DISCONTINUED | OUTPATIENT
Start: 2023-02-12 | End: 2023-02-15

## 2023-02-12 RX ORDER — HYOSCYAMINE SULFATE 0.12 MG/5ML
5 LIQUID ORAL EVERY 4 HOURS PRN
COMMUNITY

## 2023-02-12 RX ORDER — HYDROCODONE BITARTRATE AND ACETAMINOPHEN 5; 325 MG/1; MG/1
1 TABLET ORAL EVERY 4 HOURS PRN
Status: DISCONTINUED | OUTPATIENT
Start: 2023-02-12 | End: 2023-02-15

## 2023-02-12 RX ORDER — AMLODIPINE BESYLATE 5 MG/1
5 TABLET ORAL DAILY
Status: DISCONTINUED | OUTPATIENT
Start: 2023-02-12 | End: 2023-02-15

## 2023-02-12 RX ORDER — MORPHINE SULFATE 2 MG/ML
2 INJECTION, SOLUTION INTRAMUSCULAR; INTRAVENOUS EVERY 2 HOUR PRN
Status: DISCONTINUED | OUTPATIENT
Start: 2023-02-12 | End: 2023-02-15

## 2023-02-12 RX ORDER — ACETAMINOPHEN 500 MG
500 TABLET ORAL EVERY 4 HOURS PRN
Status: DISCONTINUED | OUTPATIENT
Start: 2023-02-12 | End: 2023-02-15

## 2023-02-12 RX ORDER — INSULIN ASPART 100 [IU]/ML
0.2 INJECTION, SOLUTION INTRAVENOUS; SUBCUTANEOUS ONCE
Status: COMPLETED | OUTPATIENT
Start: 2023-02-12 | End: 2023-02-12

## 2023-02-12 RX ORDER — GABAPENTIN 100 MG/1
100 CAPSULE ORAL NIGHTLY
Status: DISCONTINUED | OUTPATIENT
Start: 2023-02-12 | End: 2023-02-15

## 2023-02-12 RX ORDER — HEPARIN SODIUM 5000 [USP'U]/ML
5000 INJECTION, SOLUTION INTRAVENOUS; SUBCUTANEOUS EVERY 8 HOURS SCHEDULED
Status: DISCONTINUED | OUTPATIENT
Start: 2023-02-12 | End: 2023-02-15

## 2023-02-12 RX ORDER — NICOTINE POLACRILEX 4 MG
15 LOZENGE BUCCAL
Status: DISCONTINUED | OUTPATIENT
Start: 2023-02-12 | End: 2023-02-15

## 2023-02-12 RX ORDER — POLYETHYLENE GLYCOL 3350 17 G/17G
17 POWDER, FOR SOLUTION ORAL DAILY PRN
Status: DISCONTINUED | OUTPATIENT
Start: 2023-02-12 | End: 2023-02-15

## 2023-02-12 RX ORDER — FLUOXETINE HYDROCHLORIDE 20 MG/1
20 CAPSULE ORAL DAILY
COMMUNITY

## 2023-02-12 RX ORDER — BISACODYL 10 MG
10 SUPPOSITORY, RECTAL RECTAL
Status: DISCONTINUED | OUTPATIENT
Start: 2023-02-12 | End: 2023-02-15

## 2023-02-12 RX ORDER — HYDROCODONE BITARTRATE AND ACETAMINOPHEN 5; 325 MG/1; MG/1
2 TABLET ORAL EVERY 4 HOURS PRN
Status: DISCONTINUED | OUTPATIENT
Start: 2023-02-12 | End: 2023-02-15

## 2023-02-12 RX ORDER — ACETAMINOPHEN 650 MG/1
650 SUPPOSITORY RECTAL EVERY 4 HOURS PRN
COMMUNITY

## 2023-02-12 RX ORDER — METOCLOPRAMIDE HYDROCHLORIDE 5 MG/ML
5 INJECTION INTRAMUSCULAR; INTRAVENOUS EVERY 8 HOURS PRN
Status: DISCONTINUED | OUTPATIENT
Start: 2023-02-12 | End: 2023-02-15

## 2023-02-12 RX ORDER — FUROSEMIDE 40 MG/1
40 TABLET ORAL DAILY
Status: DISCONTINUED | OUTPATIENT
Start: 2023-02-12 | End: 2023-02-15

## 2023-02-12 RX ORDER — MULTIPLE VITAMINS W/ MINERALS TAB 9MG-400MCG
1 TAB ORAL DAILY
Status: DISCONTINUED | OUTPATIENT
Start: 2023-02-12 | End: 2023-02-15

## 2023-02-12 RX ORDER — MELATONIN
325
Status: DISCONTINUED | OUTPATIENT
Start: 2023-02-13 | End: 2023-02-15

## 2023-02-12 RX ORDER — ACETAMINOPHEN 325 MG/1
650 TABLET ORAL EVERY 4 HOURS PRN
Status: DISCONTINUED | OUTPATIENT
Start: 2023-02-12 | End: 2023-02-15

## 2023-02-12 RX ORDER — DEXTROSE MONOHYDRATE 25 G/50ML
50 INJECTION, SOLUTION INTRAVENOUS
Status: DISCONTINUED | OUTPATIENT
Start: 2023-02-12 | End: 2023-02-15

## 2023-02-12 RX ORDER — ONDANSETRON 2 MG/ML
4 INJECTION INTRAMUSCULAR; INTRAVENOUS EVERY 6 HOURS PRN
Status: DISCONTINUED | OUTPATIENT
Start: 2023-02-12 | End: 2023-02-15

## 2023-02-12 NOTE — ED QUICK NOTES
Orders for admission, patient is aware of plan and ready to go upstairs. Any questions, please call ED RN Mila at extension [phone]. Vaccinated? 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 Pt received dose of zosyn here in ED    LOC at time of transport: pt alert and oriented, hx of confusion at times    Other pertinent information:  Pt arrived from [place] assisted living with multiple falls in last 24 hours. Pt with wound on bottom of right foot. Pt is on hospice because pt refused amputation. Wound is infected. Hospice aware and pt's son will rescind hospice while inpatient. [facility] pt needs higher level of care than they can provide.    CIWA score=n/a  NIH score=n/a

## 2023-02-12 NOTE — ED QUICK NOTES
Dressing to right foot removed and pt noted to have malodorous wound with brown discharge. Pt noted to have redness and warmth to bottom of foot.

## 2023-02-12 NOTE — ED QUICK NOTES
Spoke with Theodora Jacques from [facility] at this time regarding update. Pt can be admitted for inpatient hospice here due to complex wound care for foot. Call Theodora Jacques when pt is admitted--[phone].

## 2023-02-12 NOTE — PROGRESS NOTES
Patient admitted via cart from ED. Oriented to room. Safety precautions initiated. Call light in reach. Skin assessment done by RN and PCT. Right knee, left knee, right foot, and sacral wounds photographed in flowsheets.

## 2023-02-12 NOTE — HOSPICE RN NOTE
Spoke with patient son Komal Calderón and JASMINA Zaragoza in the ED about the plan for the patient. The son states that they wish to pursue aggressive treatment at this time and would like to revoke hospice. Writer offered inpatient hospice for symptom management but family still wished to revoke. Residential team to follow while the patient is in the hospital to help plan for discharge if the patient decides they want to go back on hospice.

## 2023-02-12 NOTE — ED QUICK NOTES
[facility] hospice  [phone]  Gracie Jones updated that awaiting physician dispo but pt may need to be admitted for IV abx    Asked hospice to contact Son to discuss. Message left for Marycruz Rubalcava, .

## 2023-02-12 NOTE — ED INITIAL ASSESSMENT (HPI)
Pt presents to the ED via EMS with complaints of fall x 8 at [facility]. Per EMS they believe this is the first time EMS has been called for recent falls. Staff report to EMS pt seems weaker. Pt lying on cart, pt alert, oriented to person,place, and month (not year), skin w/d,resps appear unlabored. + abrasion to right knee, appears scabbed. Gauze dressing in place to right foot. **upon arrival of pt's son, pt is currently on hospice because pt has wound to right foot and it was recommended that she has an amputation which she refused. Son states she is being treated with oral antibiotics for infection. Son states pt has had 7 falls in the last 24 hours and that [facility] will not accept pt back to their facility and recommends rehab.

## 2023-02-12 NOTE — ED QUICK NOTES
Pt back from xray without incident. Pt sleeping and in no distress. Pt then awoke and asked for water. Robles Bauer per MD. Pt given water per request and tolerated well.

## 2023-02-12 NOTE — ED QUICK NOTES
Per Nitish Anders from residential [facility], after speaking with the son, it sounds like family will rescind hospice during admission due to the need for more aggressive treatment than pt would having being on hospice.

## 2023-02-12 NOTE — ED QUICK NOTES
Pt asleep but easily aroused on cart. Pt and son aware awaiting for bed assignment. Gauze  4 x 4's placed to bottom of foot with kerlex wrap.

## 2023-02-12 NOTE — ED QUICK NOTES
[facility] called looking for update. Requested they call back shortly for update. None available right now.

## 2023-02-13 LAB
ANION GAP SERPL CALC-SCNC: 9 MMOL/L (ref 0–18)
BASOPHILS # BLD AUTO: 0.06 X10(3) UL (ref 0–0.2)
BASOPHILS NFR BLD AUTO: 0.5 %
BUN BLD-MCNC: 30 MG/DL (ref 7–18)
CALCIUM BLD-MCNC: 9.2 MG/DL (ref 8.5–10.1)
CHLORIDE SERPL-SCNC: 94 MMOL/L (ref 98–112)
CO2 SERPL-SCNC: 28 MMOL/L (ref 21–32)
CREAT BLD-MCNC: 1.16 MG/DL
EOSINOPHIL # BLD AUTO: 0.11 X10(3) UL (ref 0–0.7)
EOSINOPHIL NFR BLD AUTO: 0.9 %
ERYTHROCYTE [DISTWIDTH] IN BLOOD BY AUTOMATED COUNT: 14.6 %
GFR SERPLBLD BASED ON 1.73 SQ M-ARVRAT: 46 ML/MIN/1.73M2 (ref 60–?)
GLUCOSE BLD-MCNC: 161 MG/DL (ref 70–99)
GLUCOSE BLD-MCNC: 189 MG/DL (ref 70–99)
GLUCOSE BLD-MCNC: 336 MG/DL (ref 70–99)
GLUCOSE BLD-MCNC: 341 MG/DL (ref 70–99)
GLUCOSE BLD-MCNC: 358 MG/DL (ref 70–99)
HCT VFR BLD AUTO: 36.9 %
HGB BLD-MCNC: 12.3 G/DL
IMM GRANULOCYTES # BLD AUTO: 0.2 X10(3) UL (ref 0–1)
IMM GRANULOCYTES NFR BLD: 1.7 %
LYMPHOCYTES # BLD AUTO: 1.39 X10(3) UL (ref 1–4)
LYMPHOCYTES NFR BLD AUTO: 11.5 %
MCH RBC QN AUTO: 27.1 PG (ref 26–34)
MCHC RBC AUTO-ENTMCNC: 33.3 G/DL (ref 31–37)
MCV RBC AUTO: 81.3 FL
MONOCYTES # BLD AUTO: 0.92 X10(3) UL (ref 0.1–1)
MONOCYTES NFR BLD AUTO: 7.6 %
NEUTROPHILS # BLD AUTO: 9.38 X10 (3) UL (ref 1.5–7.7)
NEUTROPHILS # BLD AUTO: 9.38 X10(3) UL (ref 1.5–7.7)
NEUTROPHILS NFR BLD AUTO: 77.8 %
OSMOLALITY SERPL CALC.SUM OF ELEC: 282 MOSM/KG (ref 275–295)
PLATELET # BLD AUTO: 213 10(3)UL (ref 150–450)
POTASSIUM SERPL-SCNC: 3.1 MMOL/L (ref 3.5–5.1)
POTASSIUM SERPL-SCNC: 3.1 MMOL/L (ref 3.5–5.1)
POTASSIUM SERPL-SCNC: 3.6 MMOL/L (ref 3.5–5.1)
RBC # BLD AUTO: 4.54 X10(6)UL
SODIUM SERPL-SCNC: 131 MMOL/L (ref 136–145)
WBC # BLD AUTO: 12.1 X10(3) UL (ref 4–11)

## 2023-02-13 PROCEDURE — 92610 EVALUATE SWALLOWING FUNCTION: CPT

## 2023-02-13 PROCEDURE — 84132 ASSAY OF SERUM POTASSIUM: CPT | Performed by: INTERNAL MEDICINE

## 2023-02-13 PROCEDURE — 97161 PT EVAL LOW COMPLEX 20 MIN: CPT

## 2023-02-13 PROCEDURE — 82962 GLUCOSE BLOOD TEST: CPT

## 2023-02-13 PROCEDURE — 4350F CNSLNG PROVIDED SYMP MNGMNT: CPT | Performed by: INTERNAL MEDICINE

## 2023-02-13 PROCEDURE — 80048 BASIC METABOLIC PNL TOTAL CA: CPT | Performed by: INTERNAL MEDICINE

## 2023-02-13 PROCEDURE — 92526 ORAL FUNCTION THERAPY: CPT

## 2023-02-13 PROCEDURE — 85025 COMPLETE CBC W/AUTO DIFF WBC: CPT | Performed by: INTERNAL MEDICINE

## 2023-02-13 PROCEDURE — 97165 OT EVAL LOW COMPLEX 30 MIN: CPT

## 2023-02-13 PROCEDURE — 97530 THERAPEUTIC ACTIVITIES: CPT

## 2023-02-13 RX ORDER — POTASSIUM CHLORIDE 20 MEQ/1
40 TABLET, EXTENDED RELEASE ORAL EVERY 4 HOURS
Status: COMPLETED | OUTPATIENT
Start: 2023-02-13 | End: 2023-02-13

## 2023-02-13 NOTE — PLAN OF CARE
A&Ox2. VSS. On room air. . IS encouraged. SCDs on BLE. Ankle pumps encouraged. Tolerating diabetic diet. Last BM 2/12. Voiding freely. Pain controlled. Dressing to right foot, C/D/I. Chairfast. Plan is TBD. Patient updated on plan of care. Safety precautions in place. Call light within reach.

## 2023-02-13 NOTE — OCCUPATIONAL THERAPY NOTE
Patient was evaluated by OT earlier today and attempted to contact son to determine PLOF. Per SW, patient's family has decided on re-instating hospice at this time. No further skilled OT intervention warranted at this time. Will sign off.

## 2023-02-13 NOTE — CONSULTS
Palliative Care  Palliative Care Consult request from Dr. Paulie Blunt noted requesting discussion for goals of care. Per RN family currently meeting with a hospice agency and f/u note indicates they have decided to reinstate hospice services. Palliative remains available if there are any changes to GOC/POC. Will sign off for now.      WILLIE Hilario  Palliative Care   Phone: [phone]     2/13/2023  4:17 PM

## 2023-02-13 NOTE — PHYSICAL THERAPY NOTE
Contacted by social work, Della Escobedo. Pt and family have decided to restart hospice and no further PT is needed. Inpt PT will currently sign off.

## 2023-02-13 NOTE — DIETARY NOTE
[facility]     Pt chart reviewed for elevated A1C of 14.4%. Noted level drawn >3 months ago on 8/24/22. Consider rechecking level during admit. Will follow up as appropriate for full nutrition assessment. Please consult if patient status changes or nutrition issues arise.      Bairon Mendoza, MS, RDN, [facility]  Clinical Dietitian  [phone]

## 2023-02-13 NOTE — CM/SW NOTE
02/13/23 1600   CM/SW Referral Data   Referral Source Social Work (self-referral)   Reason for Referral Discharge planning   Informant Son;EMR;Clinical Staff Member;Other   Patient Info   Patient's Current Mental Status at Time of Assessment Confused or unable to complete assessment   Patient's Home Environment Assisted Living   Post Acute Care Provider Upon Admission   ([facility])   Patient Status Prior to Admission   Services in place prior to admission Other (comment)  ([facility])   Discharge Needs   Anticipated D/C needs Long term care facility; Hospice;Transportation services   Services Requested   PASRR Level 1 Submitted Yes       Patient is an 77 y/o woman with history of complex foot wound currently admitted s/p falls at [facility]. Pt resides at [place] [address] and has been receiving [facility] care prior to admission. Pt's family revoked hospice for admit. Met with pt's son, Helen Barker ([phone]), DOTTIE and Emile Cedillo, hospice representative from [facility] (formerly [address] [place] [address]). Pt's family requested to meet with [facility] in order to change to new hospice provider. Pt's son confirmed that they wish to pursue hospice and are not interested in additional medical treatment for foot infection or other medical issues. Pt's son stated pt has had multiple falls while at [facility]. They would like to move patient into private pay nursing home facility with [facility]. Pt's family interested in [address], [facility] or [place]. Plan for DC to NH facility with hospice once arrangements in place. Referrals sent to NH facilities as above and also to [facility] (listed in [address] as [facility]). Await confirmation of accepting NH facility for DC with hospice. PASRR completed and queued for review. / to remain available for support and/or discharge planning.      Irving Jensen [facility]  Discharge Planner  [phone]

## 2023-02-13 NOTE — PLAN OF CARE
Alert and oriented x2. VSS. On RA. . IS encouraged. SCDs on BLE. Denies pain at this time. Pt reported to have difficulty eating, speech consulted. Pt passed dysphagia screening. Gauze dressing to RLE C/D/I. Mepilex to sacrum C/D/I. Voiding via purewick, LBM 2/12. IV abx as ordered. Plan is for speech to see and PT/OT. Call light within reach.

## 2023-02-13 NOTE — PROGRESS NOTES
Patient coughing while eating food. No history of modified consistency diet per the [facility] transfer report and orders. Hospitalist updated and speech consult placed. Blood sugar elevated, 501. Hospitalist notified. No further orders bedsides the Novolog sliding scale.

## 2023-02-14 LAB
ANION GAP SERPL CALC-SCNC: 5 MMOL/L (ref 0–18)
BASOPHILS # BLD AUTO: 0.08 X10(3) UL (ref 0–0.2)
BASOPHILS NFR BLD AUTO: 0.6 %
BUN BLD-MCNC: 23 MG/DL (ref 7–18)
CALCIUM BLD-MCNC: 8.7 MG/DL (ref 8.5–10.1)
CHLORIDE SERPL-SCNC: 96 MMOL/L (ref 98–112)
CO2 SERPL-SCNC: 28 MMOL/L (ref 21–32)
CREAT BLD-MCNC: 1.3 MG/DL
EOSINOPHIL # BLD AUTO: 0.06 X10(3) UL (ref 0–0.7)
EOSINOPHIL NFR BLD AUTO: 0.4 %
ERYTHROCYTE [DISTWIDTH] IN BLOOD BY AUTOMATED COUNT: 14.8 %
ESTIMATED AVERAGE GLUCOSE: >427 MG/DL
GFR SERPLBLD BASED ON 1.73 SQ M-ARVRAT: 41 ML/MIN/1.73M2 (ref 60–?)
GLUCOSE BLD-MCNC: 304 MG/DL (ref 70–99)
GLUCOSE BLD-MCNC: 401 MG/DL (ref 70–99)
GLUCOSE BLD-MCNC: 406 MG/DL (ref 70–99)
GLUCOSE BLD-MCNC: 428 MG/DL (ref 70–99)
GLUCOSE BLD-MCNC: 471 MG/DL (ref 70–99)
GLUCOSE BLD-MCNC: 481 MG/DL (ref 70–99)
GLUCOSE BLD-MCNC: >600 MG/DL (ref 70–99)
HCT VFR BLD AUTO: 33 %
HEMOGLOBIN A1C: >16.5 %
HGB BLD-MCNC: 10.9 G/DL
IMM GRANULOCYTES # BLD AUTO: 0.28 X10(3) UL (ref 0–1)
IMM GRANULOCYTES NFR BLD: 2 %
LYMPHOCYTES # BLD AUTO: 0.97 X10(3) UL (ref 1–4)
LYMPHOCYTES NFR BLD AUTO: 7 %
MCH RBC QN AUTO: 26.8 PG (ref 26–34)
MCHC RBC AUTO-ENTMCNC: 33 G/DL (ref 31–37)
MCV RBC AUTO: 81.3 FL
MONOCYTES # BLD AUTO: 0.85 X10(3) UL (ref 0.1–1)
MONOCYTES NFR BLD AUTO: 6.1 %
NEUTROPHILS # BLD AUTO: 11.66 X10 (3) UL (ref 1.5–7.7)
NEUTROPHILS # BLD AUTO: 11.66 X10(3) UL (ref 1.5–7.7)
NEUTROPHILS NFR BLD AUTO: 83.9 %
OSMOLALITY SERPL CALC.SUM OF ELEC: 289 MOSM/KG (ref 275–295)
PLATELET # BLD AUTO: 205 10(3)UL (ref 150–450)
POTASSIUM SERPL-SCNC: 4.1 MMOL/L (ref 3.5–5.1)
RBC # BLD AUTO: 4.06 X10(6)UL
SODIUM SERPL-SCNC: 129 MMOL/L (ref 136–145)
WBC # BLD AUTO: 13.9 X10(3) UL (ref 4–11)

## 2023-02-14 PROCEDURE — 82962 GLUCOSE BLOOD TEST: CPT

## 2023-02-14 PROCEDURE — 85025 COMPLETE CBC W/AUTO DIFF WBC: CPT | Performed by: INTERNAL MEDICINE

## 2023-02-14 PROCEDURE — 99214 OFFICE O/P EST MOD 30 MIN: CPT

## 2023-02-14 PROCEDURE — 92526 ORAL FUNCTION THERAPY: CPT

## 2023-02-14 PROCEDURE — 80048 BASIC METABOLIC PNL TOTAL CA: CPT | Performed by: INTERNAL MEDICINE

## 2023-02-14 RX ORDER — TRAMADOL HYDROCHLORIDE 50 MG/1
50 TABLET ORAL EVERY 8 HOURS PRN
Status: DISCONTINUED | OUTPATIENT
Start: 2023-02-14 | End: 2023-02-15

## 2023-02-14 RX ORDER — CIPROFLOXACIN 500 MG/1
500 TABLET, FILM COATED ORAL
Status: DISCONTINUED | OUTPATIENT
Start: 2023-02-14 | End: 2023-02-15

## 2023-02-14 NOTE — CM/SW NOTE
Received call from [facility] with [facility] who stated pt's family has chosen [facility]. She requested DC tomorrow at 11am.  Ambulance transport scheduled for 11am tomorrow. PCS form completed and available for RN to print. Facility reserved in [address] and message sent to them with DC plan. Updated pt's son who is agreeable with DC plan for tomorrow but concerned about bed availability at [facility] for tomorrow. Spoke with [facility] from [place] who did confirm with  at [facility] that private room is available for pt. Updated pt's RN. No further needs at this time. / to remain available for support and/or discharge planning.      [facility]  [phone]    [address] [place] [address],  [phone]    [facility]  Discharge Planner  [phone]

## 2023-02-14 NOTE — PLAN OF CARE
Patient awaiting discharge. Vitals stable. Pain not controlled on tylenol. Tramadol order obtained and administered. Will monitor pain. Problem: PAIN - ADULT  Goal: Verbalizes/displays adequate comfort level or patient's stated pain goal  Description: INTERVENTIONS:  - Encourage pt to monitor pain and request assistance  - Assess pain using appropriate pain scale  - Administer analgesics based on type and severity of pain and evaluate response  - Implement non-pharmacological measures as appropriate and evaluate response  - Consider cultural and social influences on pain and pain management  - Manage/alleviate anxiety  - Utilize distraction and/or relaxation techniques  - Monitor for opioid side effects  - Notify MD/LIP if interventions unsuccessful or patient reports new pain  - Anticipate increased pain with activity and pre-medicate as appropriate  Outcome: Progressing     Problem: SAFETY ADULT - FALL  Goal: Free from fall injury  Description: INTERVENTIONS:  - Assess pt frequently for physical needs  - Identify cognitive and physical deficits and behaviors that affect risk of falls.   - [place] fall precautions as indicated by assessment.  - Educate pt/family on patient safety including physical limitations  - Instruct pt to call for assistance with activity based on assessment  - Modify environment to reduce risk of injury  - Provide assistive devices as appropriate  - Consider OT/PT consult to assist with strengthening/mobility  - Encourage toileting schedule  Outcome: Progressing     Problem: DISCHARGE PLANNING  Goal: Discharge to home or other facility with appropriate resources  Description: INTERVENTIONS:  - Identify barriers to discharge w/pt and caregiver  - Include patient/family/discharge partner in discharge planning  - Arrange for needed discharge resources and transportation as appropriate  - Identify discharge learning needs (meds, wound care, etc)  - Arrange for interpreters to assist at discharge as needed  - Consider post-discharge preferences of patient/family/discharge partner  - Complete POLST form as appropriate  - Assess patient's ability to be responsible for managing their own health  - Refer to Case Management Department for coordinating discharge planning if the patient needs post-hospital services based on physician/LIP order or complex needs related to functional status, cognitive ability or social support system  Outcome: Progressing     Problem: Patient/Family Goals  Goal: Patient/Family Long Term Goal  Description: Patient's Long Term Goal: Ambulate TID and pain management    Interventions:  - Work with PT/OT  - See additional Care Plan goals for specific interventions  Outcome: Progressing  Goal: Patient/Family Short Term Goal  Description: Patient's Short Term Goal: Pain management    Interventions:   - Pharm and nonpharm interventions  - See additional Care Plan goals for specific interventions  Outcome: Progressing

## 2023-02-14 NOTE — PLAN OF CARE
A/o x2. RA/. 1800 ada with qid accuchecks o/s/f. LBM 2/13. Purewick in place. Bilateral knee skin tears ZURI. Sacrum with mepilex dsg. IV abx infusing. Repositioned. All safety measures in place. Call light within reach instructed pt to call for help or assistance.

## 2023-02-14 NOTE — CM/SW NOTE
Attempted to reach pt's son to discuss DC planning for hospice in NH - message left. Spoke with Vanessa Gilbert from [facility] ([phone]) who stated pt's son is touring facilities. Pt accepted at [facility], [facility] and [facility]. She anticipates son will make a decision today and plan for DC to NH with hospice tomorrow. / to remain available for support and/or discharge planning. Yvon Christianson, [facility]  Discharge Planner  [phone]    Addendum:  Received call from pt's son who confirmed he is touring facilities today. Laura Ferrisles list of accepting facilities emailed to him. He plans to chose facility later today for anticipated DC tomorrow.

## 2023-02-15 VITALS
SYSTOLIC BLOOD PRESSURE: 121 MMHG | BODY MASS INDEX: 24.22 KG/M2 | OXYGEN SATURATION: 96 % | HEIGHT: 67 IN | TEMPERATURE: 99 F | DIASTOLIC BLOOD PRESSURE: 60 MMHG | HEART RATE: 87 BPM | RESPIRATION RATE: 17 BRPM | WEIGHT: 154.31 LBS

## 2023-02-15 LAB
GLUCOSE BLD-MCNC: 180 MG/DL (ref 70–99)
POTASSIUM SERPL-SCNC: 4.3 MMOL/L (ref 3.5–5.1)

## 2023-02-15 PROCEDURE — 82962 GLUCOSE BLOOD TEST: CPT

## 2023-02-15 PROCEDURE — 84132 ASSAY OF SERUM POTASSIUM: CPT | Performed by: INTERNAL MEDICINE

## 2023-02-15 RX ORDER — HYDROCODONE BITARTRATE AND ACETAMINOPHEN 5; 325 MG/1; MG/1
1 TABLET ORAL EVERY 4 HOURS PRN
Qty: 20 TABLET | Refills: 0 | Status: SHIPPED | OUTPATIENT
Start: 2023-02-15

## 2023-02-15 RX ORDER — CIPROFLOXACIN 500 MG/1
500 TABLET, FILM COATED ORAL 2 TIMES DAILY
Qty: 8 TABLET | Refills: 0 | Status: SHIPPED | OUTPATIENT
Start: 2023-02-15

## 2023-02-15 NOTE — PLAN OF CARE
Patient awaiting discharge. Vitals stable, pain controlled on norco. Drsg dry intact. Problem: PAIN - ADULT  Goal: Verbalizes/displays adequate comfort level or patient's stated pain goal  Description: INTERVENTIONS:  - Encourage pt to monitor pain and request assistance  - Assess pain using appropriate pain scale  - Administer analgesics based on type and severity of pain and evaluate response  - Implement non-pharmacological measures as appropriate and evaluate response  - Consider cultural and social influences on pain and pain management  - Manage/alleviate anxiety  - Utilize distraction and/or relaxation techniques  - Monitor for opioid side effects  - Notify MD/LIP if interventions unsuccessful or patient reports new pain  - Anticipate increased pain with activity and pre-medicate as appropriate  Outcome: Progressing     Problem: SAFETY ADULT - FALL  Goal: Free from fall injury  Description: INTERVENTIONS:  - Assess pt frequently for physical needs  - Identify cognitive and physical deficits and behaviors that affect risk of falls.   - Carson fall precautions as indicated by assessment.  - Educate pt/family on patient safety including physical limitations  - Instruct pt to call for assistance with activity based on assessment  - Modify environment to reduce risk of injury  - Provide assistive devices as appropriate  - Consider OT/PT consult to assist with strengthening/mobility  - Encourage toileting schedule  Outcome: Progressing     Problem: DISCHARGE PLANNING  Goal: Discharge to home or other facility with appropriate resources  Description: INTERVENTIONS:  - Identify barriers to discharge w/pt and caregiver  - Include patient/family/discharge partner in discharge planning  - Arrange for needed discharge resources and transportation as appropriate  - Identify discharge learning needs (meds, wound care, etc)  - Arrange for interpreters to assist at discharge as needed  - Consider post-discharge preferences of patient/family/discharge partner  - Complete POLST form as appropriate  - Assess patient's ability to be responsible for managing their own health  - Refer to Case Management Department for coordinating discharge planning if the patient needs post-hospital services based on physician/LIP order or complex needs related to functional status, cognitive ability or social support system  Outcome: Progressing     Problem: Patient/Family Goals  Goal: Patient/Family Long Term Goal  Description: Patient's Long Term Goal: Ambulate TID and pain management    Interventions:  - Work with PT/OT  - See additional Care Plan goals for specific interventions  Outcome: Progressing  Goal: Patient/Family Short Term Goal  Description: Patient's Short Term Goal: Pain management    Interventions:   - Pharm and nonpharm interventions  - See additional Care Plan goals for specific interventions  Outcome: Progressing

## 2023-02-15 NOTE — PLAN OF CARE
A&Ox3, is forgetful. VSS on room air. Reporting pain states that her pain is tolerable. Ordered cipro po to be started tonight. Denies any numbness/tingling. Voiding via purewick. Mepilex to her sacrum. Plan to go home tomorrow to [facility]. Discussed plan of care with patient. Safety precautions in place.

## 2023-02-15 NOTE — PROGRESS NOTES
Patient discharged to [facility] via ambulance. Vitals stable, pain controlled. Family present. Report called in. Paperwork sent.

## 2024-06-21 NOTE — PLAN OF CARE
Assumed patient care 0730  Patient alert and oriented X4, forgetful at times  On room air, VSS, NSR on tele  Patient managed with scheduled tramadol and PRN tylenol  Left groin site C/D/I, soft, no hematoma  Up 2 max assist pivot to commode, voiding  Right foot dressing changed per order, clarified with Podiatry 1st surgical dressing has already been done, okay to proceed with dressing changes as ordered  Wound care consult placed, wound care to see tomorrow  Pulses per doppler   Patient has no IV access, not tolerating, refusing, MD aware  PT recommending RACH, await placement   Patient updated on plan of care    Problem: Diabetes/Glucose Control  Goal: Glucose maintained within prescribed range  Description: INTERVENTIONS:  - Monitor Blood Glucose as ordered  - Assess for signs and symptoms of hyperglycemia and hypoglycemia  - Administer ordered medications to maintain glucose within target range  - Assess barriers to adequate nutritional intake and initiate nutrition consult as needed  - Instruct patient on self management of diabetes  Outcome: Progressing     Problem: PAIN - ADULT  Goal: Verbalizes/displays adequate comfort level or patient's stated pain goal  Description: INTERVENTIONS:  - Encourage pt to monitor pain and request assistance  - Assess pain using appropriate pain scale  - Administer analgesics based on type and severity of pain and evaluate response  - Implement non-pharmacological measures as appropriate and evaluate response  - Consider cultural and social influences on pain and pain management  - Manage/alleviate anxiety  - Utilize distraction and/or relaxation techniques  - Monitor for opioid side effects  - Notify MD/LIP if interventions unsuccessful or patient reports new pain  - Anticipate increased pain with activity and pre-medicate as appropriate  Outcome: Progressing Is This A New Presentation, Or A Follow-Up?: Follow Up Isotretinoin Additional History: Patient is experiencing the side effect of dryness, but reports that the dryness is tolerable and mangeable. Patient is experiencing the side effect of cheilitis but this manageable. Patient denies headache, blurry vision, muscle aches, joint pains, any mood changes including denying depression/sadness and denies diarrhea or abdominal discomfort.  2 new pimple this past month. Patient reports 100% compliance with both forms of birth control.

## (undated) DEVICE — SUT VICRYL 2-0 FS-1 J443H

## (undated) DEVICE — BANDAGE ROLL,100% COTTON, 6 PLY, LARGE: Brand: KERLIX

## (undated) DEVICE — SUPER SPONGES,MEDIUM: Brand: KERLIX

## (undated) DEVICE — PREMIUM WET SKIN PREP TRAY: Brand: MEDLINE INDUSTRIES, INC.

## (undated) DEVICE — BLADE SAW KM33-11

## (undated) DEVICE — STERILE POLYISOPRENE POWDER-FREE SURGICAL GLOVES: Brand: PROTEXIS

## (undated) DEVICE — STANDARD HYPODERMIC NEEDLE,POLYPROPYLENE HUB: Brand: MONOJECT

## (undated) DEVICE — MEGADYNE ELECTRODE ADULT PT RT

## (undated) DEVICE — SOLUTION  .9 3000ML

## (undated) DEVICE — SOLUTION  .9 1000ML BTL

## (undated) DEVICE — NON-ADHERENT STRIPS,OIL EMULSION: Brand: CURITY

## (undated) DEVICE — LOWER EXTREMITY CDS-LF: Brand: MEDLINE INDUSTRIES, INC.

## (undated) DEVICE — SLEEVE KENDALL SCD EXPRESS MED

## (undated) DEVICE — SUT VICRYL 3-0 RB-1 J215H

## (undated) DEVICE — TUBING CYSTO

## (undated) NOTE — IP AVS SNAPSHOT
[address]            (For Outpatient Use Only) Initial Admit Date: 2022   Inpt/Obs Admit Date: Inpt: 22 / Obs: N/A   Discharge Date:    Leah Marshall:  [de-identified]   MRN: [de-identified]   CSN: [id number]   CEID: [id number]        ENCOUNTER  Patient Class: Inpatient Admitting Provider: Catherine Jasso MD Unit: [facility] Service: Cardiac Telemetry Attending Provider: Zeina Best MD   Bed: 2410-A   Visit Type:   Referring Physician: No ref. provider found Billing Flag:    Admit Diagnosis: Hyperglycemia [R73.9]      PATIENT  Legal Name:   Chalo Canales    Legal Sex: Female  Gender ID:              [address] [facility] [address] Name:    PCP:  Jaclyn Cai MD Home: [phone]   Address:  [place] [address] : 3/12/1940 (82 yrs) Mobile: No mobile phone on file. [facility] [address] Marital:  Language: [facility]: [employer] SSN4: [id number] Mormon: Wishes Privacy     Race: White Ethnicity: Non  Or  O*   EMERGENCY CONTACT   Name Relationship Legal Guardian? Home Phone Work Phone Mobile Phone   1.  Bro Jimenez  2. *No Contact Specified* Shawn            [phone]       GUARANTOR  Guarantor: Kelsea Will : 3/12/1940 Home Phone: [phone]   Address: [place] [address]  Sex: Female Work Phone:    City/State/Zip: [place], [address] [facility]   Rel. to Patient: Self Guarantor ID: [id number]   [address]   Employer:  Status: RETIRED     COVERAGE  PRIMARY INSURANCE   [facility] Plan: [address] [place] [address]   Group Number: [id number] Insurance Type: [id number] Name: Morris Sales : 1940   Subscriber ID: [id number] Pt Rel to Subscriber: Self   SECONDARY INSURANCE   Payor:  Plan:    Group Number:  Insurance Type:    Subscriber Name:  Subscriber :    Subscriber ID:  Pt Rel to Subscriber:    TERTIARY INSURANCE   Payor:  Plan:    Group Number:  Insurance Type:    Subscriber Name:  Subscriber :    Subscriber ID: Pt Rel to Subscriber:    Hospital Account Financial Class: Medicare Advantage    September 2, 2022

## (undated) NOTE — LETTER
[facility] [address] [facility]  Consent for Procedure/Sedation    Date: 08/28/2022    Time: 1330      1. I hereby authorize Perla Benitez, my physician and his/her assistants (if applicable), which may include medical students, residents, and/or fellows, to perform the following surgical operation/ procedure and administer such anesthesia as may be determined necessary by my physician:  Operation/Procedure name (s) Peripheral angiography, atherectomy, percutaneous transluminal angioplasty (PTA) and/or vascular stenting on Julita Back  2. I recognize that during the surgical operation/procedure, unforeseen conditions may necessitate additional or different procedures than those listed above. I, therefore, further authorize and request that the above-named surgeon, assistants, or designees perform such procedures as are, in their judgment, necessary and desirable. 3.   My surgeon/physician has discussed prior to my surgery the potential benefits, risks and side effects of this procedure; the likelihood of achieving goals; and potential problems that might occur during recuperation. They also discussed reasonable alternatives to the procedure, including risks, benefits, and side effects related to the alternatives and risks related to not receiving this procedure. I have had all my questions answered and I acknowledge that no guarantee has been made as to the result that may be obtained. 4.   Should the need arise during my operation or immediate post-operative period, I also consent to the administration of blood and/or blood products. Further, I understand that despite careful testing and screening of blood or blood products by collecting agencies, I may still be subject to ill effects as a result of receiving a blood transfusion and/or blood products.   The following are some, but not all, of the potential risks that can occur: fever and allergic reactions, hemolytic reactions, transmission of diseases such as Hepatitis, AIDS and Cytomegalovirus (CMV) and fluid overload. In the event that I wish to have an autologous transfusion of my own blood, or a directed donor transfusion. I will discuss this with my physician. 5.   I authorize the use of any specimen, organs, tissues, body parts or foreign objects that may be removed from my body during the operation/procedure for diagnosis, research or teaching purposes and their subsequent disposal by hospital authorities. I also authorize the release of specimen test results and/or written reports to my treating physician on the hospital medical staff or other referring or consulting physicians involved in my care, at the discretion of the Pathologist or my treating physician. 6.   I consent to the photographing or videotaping of the operations or procedures to be performed, including appropriate portions of my body for medical, scientific, or educational purposes, provided my identity is not revealed by the pictures or by descriptive texts accompanying them. If the procedure has been photographed/videotaped, the surgeon will obtain the original picture, image, videotape or CD. The hospital will not be responsible for storage, release or maintenance of the picture, image, tape or CD.    7.   I consent to the presence of a  or observers in the operating room as deemed necessary by my physician or their designees. 8.   I recognize that in the event my procedure results in extended X-Ray/fluoroscopy time, I may develop a skin reaction. 9. If I have a Do Not Attempt Resuscitation (DNAR) order in place, that status will be suspended while in the operating room, procedural suite, and during the recovery period unless otherwise explicitly stated by me (or a person authorized to consent on my behalf).  The surgeon or my attending physician will determine when the applicable recovery period ends for purposes of reinstating the DNAR order. 10. Patients having a sterilization procedure: I understand that if the procedure is successful the results will be permanent and it will therefore be impossible for me to inseminate, conceive, or bear children. I also understand that the procedure is intended to result in sterility, although the result has not been guaranteed. 11. I acknowledge that my physician has explained sedation/analgesia administration to me including the risk and benefits I consent to the administration of sedation/analgesia as may be necessary or desirable in the judgment of my physician.     I CERTIFY THAT I HAVE READ AND FULLY UNDERSTAND THE ABOVE CONSENT TO OPERATION and/or OTHER PROCEDURE.      _________________________________________  __________________________________  Signature of Patient     Signature of Responsible Person         ___________________________________         Printed Name of Responsible Person           _________________________________                 Relationship to Patient  _________________________________________  ______________________________  Signature of Witness          Date  Time      Patient Name: Adelita Reza     : 3/12/1940                 Printed: 2022     Medical Record #: [id number]                     Page 1 of 1